# Patient Record
Sex: FEMALE | Race: WHITE | NOT HISPANIC OR LATINO | Employment: FULL TIME | URBAN - METROPOLITAN AREA
[De-identification: names, ages, dates, MRNs, and addresses within clinical notes are randomized per-mention and may not be internally consistent; named-entity substitution may affect disease eponyms.]

---

## 2024-11-08 ENCOUNTER — HOSPITAL ENCOUNTER (INPATIENT)
Facility: HOSPITAL | Age: 24
LOS: 3 days | Discharge: HOME | DRG: 854 | End: 2024-11-11
Attending: FAMILY MEDICINE | Admitting: FAMILY MEDICINE
Payer: MEDICAID

## 2024-11-08 DIAGNOSIS — H72.93: Primary | ICD-10-CM

## 2024-11-08 DIAGNOSIS — H70.90 MASTOIDITIS: ICD-10-CM

## 2024-11-08 DIAGNOSIS — R07.9 CHEST PAIN: ICD-10-CM

## 2024-11-08 PROBLEM — E66.01 SEVERE OBESITY (BMI >= 40): Status: ACTIVE | Noted: 2024-11-08

## 2024-11-08 PROBLEM — H70.091: Status: ACTIVE | Noted: 2024-11-08

## 2024-11-08 PROBLEM — G51.0: Status: ACTIVE | Noted: 2024-11-08

## 2024-11-08 PROBLEM — E66.813 CLASS 3 SEVERE OBESITY WITH BODY MASS INDEX (BMI) OF 50.0 TO 59.9 IN ADULT: Status: ACTIVE | Noted: 2024-11-08

## 2024-11-08 PROBLEM — D72.829 LEUKOCYTOSIS: Status: ACTIVE | Noted: 2024-11-08

## 2024-11-08 PROBLEM — E66.01 CLASS 3 SEVERE OBESITY WITH BODY MASS INDEX (BMI) OF 50.0 TO 59.9 IN ADULT: Status: ACTIVE | Noted: 2024-11-08

## 2024-11-08 PROBLEM — Z86.69 HISTORY OF BELL PALSY: Status: ACTIVE | Noted: 2024-11-08

## 2024-11-08 PROCEDURE — 99223 1ST HOSP IP/OBS HIGH 75: CPT | Mod: 25,,, | Performed by: STUDENT IN AN ORGANIZED HEALTH CARE EDUCATION/TRAINING PROGRAM

## 2024-11-08 PROCEDURE — 69210 REMOVE IMPACTED EAR WAX UNI: CPT | Mod: ,,, | Performed by: STUDENT IN AN ORGANIZED HEALTH CARE EDUCATION/TRAINING PROGRAM

## 2024-11-08 PROCEDURE — 20600001 HC STEP DOWN PRIVATE ROOM

## 2024-11-08 RX ORDER — METRONIDAZOLE 500 MG/100ML
500 INJECTION, SOLUTION INTRAVENOUS
Status: DISCONTINUED | OUTPATIENT
Start: 2024-11-08 | End: 2024-11-11 | Stop reason: HOSPADM

## 2024-11-08 RX ORDER — IBUPROFEN 200 MG
16 TABLET ORAL
Status: DISCONTINUED | OUTPATIENT
Start: 2024-11-08 | End: 2024-11-11 | Stop reason: HOSPADM

## 2024-11-08 RX ORDER — NALOXONE HCL 0.4 MG/ML
0.02 VIAL (ML) INJECTION
Status: DISCONTINUED | OUTPATIENT
Start: 2024-11-08 | End: 2024-11-11 | Stop reason: HOSPADM

## 2024-11-08 RX ORDER — MUPIROCIN 20 MG/G
OINTMENT TOPICAL 2 TIMES DAILY
Status: DISCONTINUED | OUTPATIENT
Start: 2024-11-09 | End: 2024-11-11 | Stop reason: HOSPADM

## 2024-11-08 RX ORDER — CEFTAZIDIME 1 G/1
1 INJECTION, POWDER, FOR SOLUTION INTRAMUSCULAR; INTRAVENOUS
Status: DISCONTINUED | OUTPATIENT
Start: 2024-11-08 | End: 2024-11-09

## 2024-11-08 RX ORDER — ALUMINUM HYDROXIDE, MAGNESIUM HYDROXIDE, AND SIMETHICONE 1200; 120; 1200 MG/30ML; MG/30ML; MG/30ML
30 SUSPENSION ORAL 4 TIMES DAILY PRN
Status: DISCONTINUED | OUTPATIENT
Start: 2024-11-08 | End: 2024-11-11 | Stop reason: HOSPADM

## 2024-11-08 RX ORDER — ACETAMINOPHEN 325 MG/1
650 TABLET ORAL EVERY 4 HOURS PRN
Status: DISCONTINUED | OUTPATIENT
Start: 2024-11-08 | End: 2024-11-11 | Stop reason: HOSPADM

## 2024-11-08 RX ORDER — GLUCAGON 1 MG
1 KIT INJECTION
Status: DISCONTINUED | OUTPATIENT
Start: 2024-11-08 | End: 2024-11-11 | Stop reason: HOSPADM

## 2024-11-08 RX ORDER — IPRATROPIUM BROMIDE AND ALBUTEROL SULFATE 2.5; .5 MG/3ML; MG/3ML
3 SOLUTION RESPIRATORY (INHALATION) EVERY 6 HOURS PRN
Status: DISCONTINUED | OUTPATIENT
Start: 2024-11-08 | End: 2024-11-11 | Stop reason: HOSPADM

## 2024-11-08 RX ORDER — ACETAMINOPHEN 325 MG/1
650 TABLET ORAL EVERY 8 HOURS PRN
Status: DISCONTINUED | OUTPATIENT
Start: 2024-11-08 | End: 2024-11-11 | Stop reason: HOSPADM

## 2024-11-08 RX ORDER — TALC
6 POWDER (GRAM) TOPICAL NIGHTLY PRN
Status: DISCONTINUED | OUTPATIENT
Start: 2024-11-08 | End: 2024-11-11 | Stop reason: HOSPADM

## 2024-11-08 RX ORDER — ONDANSETRON 8 MG/1
8 TABLET, ORALLY DISINTEGRATING ORAL EVERY 8 HOURS PRN
Status: DISCONTINUED | OUTPATIENT
Start: 2024-11-08 | End: 2024-11-11 | Stop reason: HOSPADM

## 2024-11-08 RX ORDER — IBUPROFEN 200 MG
24 TABLET ORAL
Status: DISCONTINUED | OUTPATIENT
Start: 2024-11-08 | End: 2024-11-11 | Stop reason: HOSPADM

## 2024-11-08 RX ORDER — SODIUM CHLORIDE 0.9 % (FLUSH) 0.9 %
10 SYRINGE (ML) INJECTION EVERY 12 HOURS PRN
Status: DISCONTINUED | OUTPATIENT
Start: 2024-11-08 | End: 2024-11-11 | Stop reason: HOSPADM

## 2024-11-09 ENCOUNTER — ANESTHESIA EVENT (OUTPATIENT)
Dept: SURGERY | Facility: HOSPITAL | Age: 24
DRG: 854 | End: 2024-11-09
Payer: MEDICAID

## 2024-11-09 ENCOUNTER — ANESTHESIA (OUTPATIENT)
Dept: SURGERY | Facility: HOSPITAL | Age: 24
DRG: 854 | End: 2024-11-09
Payer: MEDICAID

## 2024-11-09 PROBLEM — F33.1 MAJOR DEPRESSIVE DISORDER, RECURRENT EPISODE, MODERATE: Status: ACTIVE | Noted: 2024-11-09

## 2024-11-09 PROBLEM — F40.01 AGORAPHOBIA WITH PANIC DISORDER: Status: ACTIVE | Noted: 2024-11-09

## 2024-11-09 PROBLEM — A41.9 SEPSIS: Status: ACTIVE | Noted: 2024-11-09

## 2024-11-09 PROBLEM — A41.9 SEPSIS: Status: ACTIVE | Noted: 2024-11-08

## 2024-11-09 LAB
ALBUMIN SERPL BCP-MCNC: 3.1 G/DL (ref 3.5–5.2)
ALP SERPL-CCNC: 73 U/L (ref 40–150)
ALT SERPL W/O P-5'-P-CCNC: 16 U/L (ref 10–44)
ANION GAP SERPL CALC-SCNC: 11 MMOL/L (ref 8–16)
ANION GAP SERPL CALC-SCNC: 7 MMOL/L (ref 8–16)
AST SERPL-CCNC: 16 U/L (ref 10–40)
BASOPHILS # BLD AUTO: 0.05 K/UL (ref 0–0.2)
BASOPHILS # BLD AUTO: 0.05 K/UL (ref 0–0.2)
BASOPHILS NFR BLD: 0.4 % (ref 0–1.9)
BASOPHILS NFR BLD: 0.4 % (ref 0–1.9)
BILIRUB SERPL-MCNC: 0.2 MG/DL (ref 0.1–1)
BUN SERPL-MCNC: 8 MG/DL (ref 6–20)
BUN SERPL-MCNC: 9 MG/DL (ref 6–20)
CALCIUM SERPL-MCNC: 8.9 MG/DL (ref 8.7–10.5)
CALCIUM SERPL-MCNC: 9 MG/DL (ref 8.7–10.5)
CHLORIDE SERPL-SCNC: 106 MMOL/L (ref 95–110)
CHLORIDE SERPL-SCNC: 106 MMOL/L (ref 95–110)
CO2 SERPL-SCNC: 22 MMOL/L (ref 23–29)
CO2 SERPL-SCNC: 25 MMOL/L (ref 23–29)
CREAT SERPL-MCNC: 0.7 MG/DL (ref 0.5–1.4)
CREAT SERPL-MCNC: 0.8 MG/DL (ref 0.5–1.4)
CRP SERPL-MCNC: 26 MG/L (ref 0–8.2)
DIFFERENTIAL METHOD BLD: ABNORMAL
DIFFERENTIAL METHOD BLD: ABNORMAL
EOSINOPHIL # BLD AUTO: 0.2 K/UL (ref 0–0.5)
EOSINOPHIL # BLD AUTO: 0.3 K/UL (ref 0–0.5)
EOSINOPHIL NFR BLD: 1.7 % (ref 0–8)
EOSINOPHIL NFR BLD: 2.3 % (ref 0–8)
ERYTHROCYTE [DISTWIDTH] IN BLOOD BY AUTOMATED COUNT: 14.5 % (ref 11.5–14.5)
ERYTHROCYTE [DISTWIDTH] IN BLOOD BY AUTOMATED COUNT: 14.5 % (ref 11.5–14.5)
ERYTHROCYTE [SEDIMENTATION RATE] IN BLOOD BY PHOTOMETRIC METHOD: 49 MM/HR (ref 0–36)
EST. GFR  (NO RACE VARIABLE): >60 ML/MIN/1.73 M^2
EST. GFR  (NO RACE VARIABLE): >60 ML/MIN/1.73 M^2
GLUCOSE SERPL-MCNC: 107 MG/DL (ref 70–110)
GLUCOSE SERPL-MCNC: 94 MG/DL (ref 70–110)
HCT VFR BLD AUTO: 39.3 % (ref 37–48.5)
HCT VFR BLD AUTO: 40.1 % (ref 37–48.5)
HGB BLD-MCNC: 11.8 G/DL (ref 12–16)
HGB BLD-MCNC: 12.2 G/DL (ref 12–16)
IMM GRANULOCYTES # BLD AUTO: 0.04 K/UL (ref 0–0.04)
IMM GRANULOCYTES # BLD AUTO: 0.05 K/UL (ref 0–0.04)
IMM GRANULOCYTES NFR BLD AUTO: 0.3 % (ref 0–0.5)
IMM GRANULOCYTES NFR BLD AUTO: 0.4 % (ref 0–0.5)
LACTATE SERPL-SCNC: 0.8 MMOL/L (ref 0.5–2.2)
LYMPHOCYTES # BLD AUTO: 2.8 K/UL (ref 1–4.8)
LYMPHOCYTES # BLD AUTO: 2.9 K/UL (ref 1–4.8)
LYMPHOCYTES NFR BLD: 20.6 % (ref 18–48)
LYMPHOCYTES NFR BLD: 20.7 % (ref 18–48)
MAGNESIUM SERPL-MCNC: 2.1 MG/DL (ref 1.6–2.6)
MCH RBC QN AUTO: 25.2 PG (ref 27–31)
MCH RBC QN AUTO: 25.2 PG (ref 27–31)
MCHC RBC AUTO-ENTMCNC: 30 G/DL (ref 32–36)
MCHC RBC AUTO-ENTMCNC: 30.4 G/DL (ref 32–36)
MCV RBC AUTO: 83 FL (ref 82–98)
MCV RBC AUTO: 84 FL (ref 82–98)
MONOCYTES # BLD AUTO: 0.9 K/UL (ref 0.3–1)
MONOCYTES # BLD AUTO: 1.1 K/UL (ref 0.3–1)
MONOCYTES NFR BLD: 6.9 % (ref 4–15)
MONOCYTES NFR BLD: 8 % (ref 4–15)
NEUTROPHILS # BLD AUTO: 9.5 K/UL (ref 1.8–7.7)
NEUTROPHILS # BLD AUTO: 9.7 K/UL (ref 1.8–7.7)
NEUTROPHILS NFR BLD: 68.3 % (ref 38–73)
NEUTROPHILS NFR BLD: 70 % (ref 38–73)
NRBC BLD-RTO: 0 /100 WBC
NRBC BLD-RTO: 0 /100 WBC
PHOSPHATE SERPL-MCNC: 3.3 MG/DL (ref 2.7–4.5)
PLATELET # BLD AUTO: 381 K/UL (ref 150–450)
PLATELET # BLD AUTO: 391 K/UL (ref 150–450)
PMV BLD AUTO: 10.2 FL (ref 9.2–12.9)
PMV BLD AUTO: 10.2 FL (ref 9.2–12.9)
POTASSIUM SERPL-SCNC: 3.8 MMOL/L (ref 3.5–5.1)
POTASSIUM SERPL-SCNC: 3.8 MMOL/L (ref 3.5–5.1)
PROT SERPL-MCNC: 7.5 G/DL (ref 6–8.4)
RBC # BLD AUTO: 4.69 M/UL (ref 4–5.4)
RBC # BLD AUTO: 4.84 M/UL (ref 4–5.4)
SODIUM SERPL-SCNC: 138 MMOL/L (ref 136–145)
SODIUM SERPL-SCNC: 139 MMOL/L (ref 136–145)
VANCOMYCIN TROUGH SERPL-MCNC: 8.7 UG/ML (ref 10–22)
WBC # BLD AUTO: 13.55 K/UL (ref 3.9–12.7)
WBC # BLD AUTO: 14.14 K/UL (ref 3.9–12.7)

## 2024-11-09 PROCEDURE — 80202 ASSAY OF VANCOMYCIN: CPT | Performed by: HOSPITALIST

## 2024-11-09 PROCEDURE — 87116 MYCOBACTERIA CULTURE: CPT | Performed by: STUDENT IN AN ORGANIZED HEALTH CARE EDUCATION/TRAINING PROGRAM

## 2024-11-09 PROCEDURE — 63600175 PHARM REV CODE 636 W HCPCS: Performed by: HOSPITALIST

## 2024-11-09 PROCEDURE — 099570Z DRAINAGE OF RIGHT MIDDLE EAR WITH DRAINAGE DEVICE, VIA NATURAL OR ARTIFICIAL OPENING: ICD-10-PCS | Performed by: STUDENT IN AN ORGANIZED HEALTH CARE EDUCATION/TRAINING PROGRAM

## 2024-11-09 PROCEDURE — 25000003 PHARM REV CODE 250

## 2024-11-09 PROCEDURE — 63600175 PHARM REV CODE 636 W HCPCS

## 2024-11-09 PROCEDURE — 87102 FUNGUS ISOLATION CULTURE: CPT | Performed by: STUDENT IN AN ORGANIZED HEALTH CARE EDUCATION/TRAINING PROGRAM

## 2024-11-09 PROCEDURE — 85652 RBC SED RATE AUTOMATED: CPT

## 2024-11-09 PROCEDURE — 36000705 HC OR TIME LEV I EA ADD 15 MIN: Performed by: STUDENT IN AN ORGANIZED HEALTH CARE EDUCATION/TRAINING PROGRAM

## 2024-11-09 PROCEDURE — 71000033 HC RECOVERY, INTIAL HOUR: Performed by: STUDENT IN AN ORGANIZED HEALTH CARE EDUCATION/TRAINING PROGRAM

## 2024-11-09 PROCEDURE — 37000009 HC ANESTHESIA EA ADD 15 MINS: Performed by: STUDENT IN AN ORGANIZED HEALTH CARE EDUCATION/TRAINING PROGRAM

## 2024-11-09 PROCEDURE — 84100 ASSAY OF PHOSPHORUS: CPT

## 2024-11-09 PROCEDURE — 27201423 OPTIME MED/SURG SUP & DEVICES STERILE SUPPLY: Performed by: STUDENT IN AN ORGANIZED HEALTH CARE EDUCATION/TRAINING PROGRAM

## 2024-11-09 PROCEDURE — 69436 CREATE EARDRUM OPENING: CPT | Mod: RT,,, | Performed by: STUDENT IN AN ORGANIZED HEALTH CARE EDUCATION/TRAINING PROGRAM

## 2024-11-09 PROCEDURE — 86140 C-REACTIVE PROTEIN: CPT

## 2024-11-09 PROCEDURE — 87040 BLOOD CULTURE FOR BACTERIA: CPT

## 2024-11-09 PROCEDURE — 87206 SMEAR FLUORESCENT/ACID STAI: CPT | Performed by: STUDENT IN AN ORGANIZED HEALTH CARE EDUCATION/TRAINING PROGRAM

## 2024-11-09 PROCEDURE — 25000003 PHARM REV CODE 250: Performed by: NURSE ANESTHETIST, CERTIFIED REGISTERED

## 2024-11-09 PROCEDURE — 36415 COLL VENOUS BLD VENIPUNCTURE: CPT

## 2024-11-09 PROCEDURE — 63600175 PHARM REV CODE 636 W HCPCS: Performed by: ANESTHESIOLOGY

## 2024-11-09 PROCEDURE — 83735 ASSAY OF MAGNESIUM: CPT

## 2024-11-09 PROCEDURE — 37000008 HC ANESTHESIA 1ST 15 MINUTES: Performed by: STUDENT IN AN ORGANIZED HEALTH CARE EDUCATION/TRAINING PROGRAM

## 2024-11-09 PROCEDURE — 80048 BASIC METABOLIC PNL TOTAL CA: CPT

## 2024-11-09 PROCEDURE — 83605 ASSAY OF LACTIC ACID: CPT

## 2024-11-09 PROCEDURE — 25000003 PHARM REV CODE 250: Performed by: ANESTHESIOLOGY

## 2024-11-09 PROCEDURE — 87070 CULTURE OTHR SPECIMN AEROBIC: CPT | Performed by: STUDENT IN AN ORGANIZED HEALTH CARE EDUCATION/TRAINING PROGRAM

## 2024-11-09 PROCEDURE — 71000015 HC POSTOP RECOV 1ST HR: Performed by: STUDENT IN AN ORGANIZED HEALTH CARE EDUCATION/TRAINING PROGRAM

## 2024-11-09 PROCEDURE — 80053 COMPREHEN METABOLIC PANEL: CPT

## 2024-11-09 PROCEDURE — 63600175 PHARM REV CODE 636 W HCPCS: Performed by: NURSE ANESTHETIST, CERTIFIED REGISTERED

## 2024-11-09 PROCEDURE — 20600001 HC STEP DOWN PRIVATE ROOM

## 2024-11-09 PROCEDURE — 36415 COLL VENOUS BLD VENIPUNCTURE: CPT | Performed by: HOSPITALIST

## 2024-11-09 PROCEDURE — 87075 CULTR BACTERIA EXCEPT BLOOD: CPT | Performed by: STUDENT IN AN ORGANIZED HEALTH CARE EDUCATION/TRAINING PROGRAM

## 2024-11-09 PROCEDURE — 25000003 PHARM REV CODE 250: Performed by: HOSPITALIST

## 2024-11-09 PROCEDURE — 36000704 HC OR TIME LEV I 1ST 15 MIN: Performed by: STUDENT IN AN ORGANIZED HEALTH CARE EDUCATION/TRAINING PROGRAM

## 2024-11-09 PROCEDURE — 85025 COMPLETE CBC W/AUTO DIFF WBC: CPT | Mod: 91

## 2024-11-09 DEVICE — GROMMET MOD ARMSTR 1.14MM: Type: IMPLANTABLE DEVICE | Site: EAR | Status: FUNCTIONAL

## 2024-11-09 RX ORDER — FENTANYL CITRATE 50 UG/ML
25 INJECTION, SOLUTION INTRAMUSCULAR; INTRAVENOUS EVERY 5 MIN PRN
Status: DISCONTINUED | OUTPATIENT
Start: 2024-11-09 | End: 2024-11-09 | Stop reason: HOSPADM

## 2024-11-09 RX ORDER — CLONAZEPAM 0.5 MG/1
0.5 TABLET ORAL 2 TIMES DAILY PRN
Status: DISCONTINUED | OUTPATIENT
Start: 2024-11-09 | End: 2024-11-11 | Stop reason: HOSPADM

## 2024-11-09 RX ORDER — TRAZODONE HYDROCHLORIDE 50 MG/1
50 TABLET ORAL NIGHTLY PRN
Status: DISCONTINUED | OUTPATIENT
Start: 2024-11-09 | End: 2024-11-11 | Stop reason: HOSPADM

## 2024-11-09 RX ORDER — BUPROPION HYDROCHLORIDE 150 MG/1
150 TABLET ORAL EVERY MORNING
Status: DISCONTINUED | OUTPATIENT
Start: 2024-11-09 | End: 2024-11-11 | Stop reason: HOSPADM

## 2024-11-09 RX ORDER — HALOPERIDOL 5 MG/ML
0.5 INJECTION INTRAMUSCULAR EVERY 10 MIN PRN
Status: DISCONTINUED | OUTPATIENT
Start: 2024-11-09 | End: 2024-11-09 | Stop reason: HOSPADM

## 2024-11-09 RX ORDER — ESCITALOPRAM OXALATE 10 MG/1
10 TABLET ORAL DAILY
COMMUNITY

## 2024-11-09 RX ORDER — BUSPIRONE HYDROCHLORIDE 7.5 MG/1
7.5 TABLET ORAL DAILY
COMMUNITY

## 2024-11-09 RX ORDER — CEFTAZIDIME 2 G/1
2 INJECTION, POWDER, FOR SOLUTION INTRAVENOUS
Status: DISCONTINUED | OUTPATIENT
Start: 2024-11-09 | End: 2024-11-11 | Stop reason: HOSPADM

## 2024-11-09 RX ORDER — CIPROFLOXACIN AND DEXAMETHASONE 3; 1 MG/ML; MG/ML
4 SUSPENSION/ DROPS AURICULAR (OTIC) 2 TIMES DAILY
Status: DISCONTINUED | OUTPATIENT
Start: 2024-11-09 | End: 2024-11-11 | Stop reason: HOSPADM

## 2024-11-09 RX ORDER — GLUCAGON 1 MG
1 KIT INJECTION
Status: DISCONTINUED | OUTPATIENT
Start: 2024-11-09 | End: 2024-11-09 | Stop reason: HOSPADM

## 2024-11-09 RX ORDER — ONDANSETRON HYDROCHLORIDE 2 MG/ML
INJECTION, SOLUTION INTRAVENOUS
Status: DISCONTINUED | OUTPATIENT
Start: 2024-11-09 | End: 2024-11-09

## 2024-11-09 RX ORDER — PROCHLORPERAZINE EDISYLATE 5 MG/ML
5 INJECTION INTRAMUSCULAR; INTRAVENOUS EVERY 30 MIN PRN
Status: DISCONTINUED | OUTPATIENT
Start: 2024-11-09 | End: 2024-11-09 | Stop reason: HOSPADM

## 2024-11-09 RX ORDER — HYDROMORPHONE HYDROCHLORIDE 1 MG/ML
0.2 INJECTION, SOLUTION INTRAMUSCULAR; INTRAVENOUS; SUBCUTANEOUS EVERY 5 MIN PRN
Status: DISCONTINUED | OUTPATIENT
Start: 2024-11-09 | End: 2024-11-09 | Stop reason: HOSPADM

## 2024-11-09 RX ORDER — ESCITALOPRAM OXALATE 5 MG/1
10 TABLET ORAL DAILY
Status: DISCONTINUED | OUTPATIENT
Start: 2024-11-09 | End: 2024-11-11 | Stop reason: HOSPADM

## 2024-11-09 RX ORDER — TRAZODONE HYDROCHLORIDE 50 MG/1
TABLET ORAL NIGHTLY PRN
COMMUNITY
Start: 2024-02-27

## 2024-11-09 RX ORDER — PROPOFOL 10 MG/ML
VIAL (ML) INTRAVENOUS
Status: DISCONTINUED | OUTPATIENT
Start: 2024-11-09 | End: 2024-11-09

## 2024-11-09 RX ORDER — MIDAZOLAM HYDROCHLORIDE 1 MG/ML
INJECTION INTRAMUSCULAR; INTRAVENOUS
Status: DISCONTINUED | OUTPATIENT
Start: 2024-11-09 | End: 2024-11-09

## 2024-11-09 RX ORDER — OXYCODONE HYDROCHLORIDE 5 MG/1
5 TABLET ORAL
Status: DISCONTINUED | OUTPATIENT
Start: 2024-11-09 | End: 2024-11-09 | Stop reason: HOSPADM

## 2024-11-09 RX ORDER — FENTANYL CITRATE 50 UG/ML
INJECTION, SOLUTION INTRAMUSCULAR; INTRAVENOUS
Status: DISCONTINUED | OUTPATIENT
Start: 2024-11-09 | End: 2024-11-09

## 2024-11-09 RX ORDER — CIPROFLOXACIN AND DEXAMETHASONE 3; 1 MG/ML; MG/ML
SUSPENSION/ DROPS AURICULAR (OTIC)
Status: DISPENSED
Start: 2024-11-09 | End: 2024-11-09

## 2024-11-09 RX ORDER — LIDOCAINE HYDROCHLORIDE 20 MG/ML
INJECTION INTRAVENOUS
Status: DISCONTINUED | OUTPATIENT
Start: 2024-11-09 | End: 2024-11-09

## 2024-11-09 RX ADMIN — SODIUM CHLORIDE: 0.9 INJECTION, SOLUTION INTRAVENOUS at 11:11

## 2024-11-09 RX ADMIN — MIDAZOLAM HYDROCHLORIDE 2 MG: 2 INJECTION, SOLUTION INTRAMUSCULAR; INTRAVENOUS at 11:11

## 2024-11-09 RX ADMIN — PROPOFOL 50 MG: 10 INJECTION, EMULSION INTRAVENOUS at 11:11

## 2024-11-09 RX ADMIN — METRONIDAZOLE 500 MG: 5 INJECTION, SOLUTION INTRAVENOUS at 04:11

## 2024-11-09 RX ADMIN — LIDOCAINE HYDROCHLORIDE 80 MG: 20 INJECTION INTRAVENOUS at 11:11

## 2024-11-09 RX ADMIN — METRONIDAZOLE 500 MG: 5 INJECTION, SOLUTION INTRAVENOUS at 10:11

## 2024-11-09 RX ADMIN — CIPROFLOXACIN AND DEXAMETHASONE 4 DROP: 3; 1 SUSPENSION/ DROPS AURICULAR (OTIC) at 07:11

## 2024-11-09 RX ADMIN — METRONIDAZOLE 500 MG: 5 INJECTION, SOLUTION INTRAVENOUS at 09:11

## 2024-11-09 RX ADMIN — CEFTAZIDIME 2 G: 2 INJECTION, POWDER, FOR SOLUTION INTRAVENOUS at 08:11

## 2024-11-09 RX ADMIN — ONDANSETRON 4 MG: 2 INJECTION INTRAMUSCULAR; INTRAVENOUS at 11:11

## 2024-11-09 RX ADMIN — MUPIROCIN: 20 OINTMENT TOPICAL at 09:11

## 2024-11-09 RX ADMIN — METRONIDAZOLE 500 MG: 5 INJECTION, SOLUTION INTRAVENOUS at 12:11

## 2024-11-09 RX ADMIN — ONDANSETRON 8 MG: 8 TABLET, ORALLY DISINTEGRATING ORAL at 12:11

## 2024-11-09 RX ADMIN — CEFTAZIDIME 1 G: 1 INJECTION, POWDER, FOR SOLUTION INTRAMUSCULAR; INTRAVENOUS at 12:11

## 2024-11-09 RX ADMIN — ESCITALOPRAM 10 MG: 5 TABLET, FILM COATED ORAL at 09:11

## 2024-11-09 RX ADMIN — VANCOMYCIN HYDROCHLORIDE 2000 MG: 10 INJECTION, POWDER, LYOPHILIZED, FOR SOLUTION INTRAVENOUS at 01:11

## 2024-11-09 RX ADMIN — VANCOMYCIN HYDROCHLORIDE 1500 MG: 1.5 INJECTION, POWDER, LYOPHILIZED, FOR SOLUTION INTRAVENOUS at 11:11

## 2024-11-09 RX ADMIN — VANCOMYCIN HYDROCHLORIDE 2000 MG: 10 INJECTION, POWDER, LYOPHILIZED, FOR SOLUTION INTRAVENOUS at 04:11

## 2024-11-09 RX ADMIN — MUPIROCIN: 20 OINTMENT TOPICAL at 07:11

## 2024-11-09 RX ADMIN — FENTANYL CITRATE 50 MCG: 50 INJECTION, SOLUTION INTRAMUSCULAR; INTRAVENOUS at 11:11

## 2024-11-09 RX ADMIN — BUSPIRONE HYDROCHLORIDE 7.5 MG: 5 TABLET ORAL at 09:11

## 2024-11-09 RX ADMIN — OXYCODONE 5 MG: 5 TABLET ORAL at 12:11

## 2024-11-09 RX ADMIN — CEFTAZIDIME 2 G: 2 INJECTION, POWDER, FOR SOLUTION INTRAVENOUS at 12:11

## 2024-11-09 RX ADMIN — HYDROMORPHONE HYDROCHLORIDE 0.2 MG: 1 INJECTION, SOLUTION INTRAMUSCULAR; INTRAVENOUS; SUBCUTANEOUS at 12:11

## 2024-11-09 RX ADMIN — PROPOFOL 200 MG: 10 INJECTION, EMULSION INTRAVENOUS at 11:11

## 2024-11-09 NOTE — ASSESSMENT & PLAN NOTE
Liat Hernandez is a 23 y.o. female with Mastoiditis [H70.90] with associated facial nerve paralysis (house Brackmann V on the right, I on the left).       -- NPO at midnight in anticipation for mastoidectomy   -- Pain/antinausea medications prn   -- Remainder of care per primary team  -- Please page ENT on call with any questions or concerns

## 2024-11-09 NOTE — ASSESSMENT & PLAN NOTE
Leukocytosis   Tympanic membrane rupture, bilateral - chronic   - 1/4 SIRS on admission; WBC 16  - lactic acid, CRP and ESR pending   - BCX ordered   - MRI temporal bone revealed fluid throughout the right mastoid air cells and within the right middle ear suggesting otomastoiditis   - started on broad spectrum mastoiditis abx: vancomycin + metronidazole + ceftaz   - ENT consulted, appreciate recommendations  -- NPO at midnight in anticipation for mastoidectomy   -- Pain/antinausea medications prn   - daily CBC and CMP   - neuro checks q4h

## 2024-11-09 NOTE — OP NOTE
DATE OF PROCEDURE: 11/9/2024     PREOPERATIVE DIAGNOSES:   Mastoiditis [H70.90]    POSTOPERATIVE DIAGNOSES:   Mastoiditis [H70.90]    SURGEON:  Surgeons and Role:     * Se Jones MD - Primary     * Carolin Osullivan MD - Resident - Assisting    PROCEDURES PERFORMED:   Right myringotomy with PET insertion.     ANESTHESIA: General/MAC    FINDINGS:  Purulent drainage in right middle ear.     INDICATIONS FOR PROCEDURE:   Liat Hernandez is a 23 y.o. female with a history of score was then right mastoid cavity.  He presents with the acute onset of right-sided facial weakness and CT scan showing opacification of her sclerotic mastoid cavity with a middle ear effusion.  She was advised to undergo drainage and insertion of PE tube for decompression of the middle ear space.    She was apprised of the risks, benefits and alternatives to surgery.  In spite of the risk inherent to surgery,she provided informed consent for the aforementioned procedures.     PROCEDURE IN DETAIL:  The patient was taken to the operating room and placed on the operating table in the supine position.  General endotracheal anesthesia was induced by the anesthesia team.     The patient was then prepped and draped in usual sterile fashion.  Preoperative time-out was taken to confirm the patient and procedure being performed.  A speculum was placed in the right external canal.  The TM was visualized and serosanguineous fluid noted behind the drum.  There was a small pocket visualized in the attic which appeared to be mucopurulent in nature.  Cultures were taken from this area again.  A myringotomy knife was used to incise the anterior inferior quadrant.  Mucoid discharge was noted within the middle ear.  A pressure equalizing tube was then placed without difficulty.  Ciprodex drops were then placed within the external order irrigated now.  A cotton ball was then placed within the external auditory canal.  This point the procedure was  deemed to be complete.  The patient was then handed over to Anesthesia who woke the patient she was transported to the recovery room in a stable condition of the time of dictation.    There were no intraoperative complications.  I was present for and participated in the entire procedure as dictated above.       ESTIMATED BLOOD LOSS: 5cc    SPECIMENS:   Specimen (24h ago, onward)      None            Se Jones MD

## 2024-11-09 NOTE — SUBJECTIVE & OBJECTIVE
Interval History:   Symptoms:   Same.  Diet:  NPO.  Activity level:   Returning to normal.  Pain:  No pain in ear.      Review of Systems   Constitutional:  Negative for fever.   HENT:  Negative for sinus pain, sore throat and trouble swallowing.    Respiratory:  Negative for shortness of breath.    Cardiovascular:  Negative for chest pain.   Gastrointestinal:  Negative for abdominal pain.     Objective:     Vital Signs (Most Recent):  Temp: 98.9 °F (37.2 °C) (11/09/24 1344)  Pulse: 91 (11/09/24 1344)  Resp: 18 (11/09/24 1344)  BP: 118/64 (11/09/24 1344)  SpO2: (!) 94 % (11/09/24 1344) Vital Signs (24h Range):  Temp:  [97.9 °F (36.6 °C)-98.9 °F (37.2 °C)] 98.9 °F (37.2 °C)  Pulse:  [81-99] 91  Resp:  [11-23] 18  SpO2:  [92 %-99 %] 94 %  BP: (118-166)/(63-88) 118/64     Weight: (!) 137 kg (302 lb 0.5 oz)  Body mass index is 55.24 kg/m².    Intake/Output Summary (Last 24 hours) at 11/9/2024 1424  Last data filed at 11/9/2024 1137  Gross per 24 hour   Intake 20 ml   Output --   Net 20 ml         Physical Exam  Constitutional:       General: She is not in acute distress.  Cardiovascular:      Rate and Rhythm: Normal rate and regular rhythm.   Pulmonary:      Effort: No respiratory distress.   Musculoskeletal:      Right lower leg: No edema.      Left lower leg: No edema.   Neurological:      Mental Status: She is alert.      Comments: + Bell Palsy             Significant Labs: All pertinent labs within the past 24 hours have been reviewed.    Significant Imaging: I have reviewed all pertinent imaging results/findings within the past 24 hours.

## 2024-11-09 NOTE — ASSESSMENT & PLAN NOTE
"2/4 SIRS on admission; WBC 16 and tachycardia  This patient does have evidence of infective focus  My overall impression is sepsis.  Source: Respiratory  Antibiotics given-   Antibiotics (72h ago, onward)      Start     Stop Route Frequency Ordered    11/09/24 1145  ciprofloxacin-dexAMETHasone 0.3-0.1% otic suspension 4 drop         -- RIGHT EAR 2 times daily 11/09/24 1042    11/09/24 0900  mupirocin 2 % ointment         11/14/24 0859 Nasl 2 times daily 11/08/24 2258    11/09/24 0900  ceftAZIDime injection 2 g         -- IV Every 8 hours (non-standard times) 11/09/24 0847    11/09/24 0200  vancomycin 2 g in dextrose 5 % 500 mL IVPB         -- IV Every 12 hours (non-standard times) 11/08/24 2257    11/08/24 2345  metronidazole IVPB 500 mg         -- IV Every 8 hours (non-standard times) 11/08/24 2240    11/08/24 2338  vancomycin - pharmacy to dose  (vancomycin IVPB (PEDS and ADULTS))        Placed in "And" Linked Group    -- IV pharmacy to manage frequency 11/08/24 2240          Source control achieved by: abx and surgery  "

## 2024-11-09 NOTE — PROGRESS NOTES
Pharmacokinetic Assessment Follow Up: IV Vancomycin    Vancomycin serum concentration assessment(s):    The trough level was drawn correctly and can be used to guide therapy at this time. The measurement is below the desired definitive target range of 10 to 20 mcg/mL.    Vancomycin Regimen Plan:    -Afebrile, on room air, without pressor requirements.  -White count down-trending.   -Blood culture 11/08 and 11/09 with no growth to date, wound culture 11/09 sent and pending.   -Given stable renal function, decreasing dose and increasing frequency from 2g IV every 12 hours to 1.5g IV every 8 hours with next serum trough concentration measured at 2300 prior to 4th dose on 11/10, or sooner if clinically warranted.     Drug levels (last 3 results):  Recent Labs   Lab Result Units 11/09/24  1547   Vancomycin-Trough ug/mL 8.7*       Pharmacy will continue to follow and monitor vancomycin.    Please contact pharmacy at extension [phone] for questions regarding this assessment.    Thank you for the consult,   Kwame Quesada       Patient brief summary:  Liat Hernandez is a 23 y.o. female initiated on antimicrobial therapy with IV Vancomycin for treatment of  mastoiditis    Drug Allergies:   Review of patient's allergies indicates:   Allergen Reactions    Penicillins Itching     Pt. Reports throat itches when she takes it    Sulfa (sulfonamide antibiotics)        Actual Body Weight:   137 kg    Renal Function:   Estimated Creatinine Clearance: 147.8 mL/min (based on SCr of 0.8 mg/dL).,     Dialysis Method (if applicable):  N/A

## 2024-11-09 NOTE — BRIEF OP NOTE
[facility] - Telemetry Stepdown  Brief Operative Note    SUMMARY     Surgery Date: 11/9/2024     Surgeons and Role:     * Se Jones MD - Primary     * Carolin Osullivan MD - Resident - Assisting        Pre-op Diagnosis:  Mastoiditis [H70.90]    Post-op Diagnosis:  Post-Op Diagnosis Codes:     * Mastoiditis [H70.90]    Procedure(s) (LRB):  MYRINGOTOMY, WITH TYMPANOSTOMY TUBE INSERTION (Right)    Anesthesia: General/MAC    Implants:  Implant Name Type Inv. Item Serial No.  Lot No. LRB No. Used Action   GROMMET MOD ARMSTR 1.14MM - [id number]  GROMMET MOD ARMSTR 1.14MM   [id number] Right 1 Implanted       Operative Findings:   R TM attic retraction with purulence vs squamous debris   R myringotomy with PE tube placement anterior inferior  Mucoid ear effusion     Estimated Blood Loss: * No values recorded between 11/9/2024 11:43 AM and 11/9/2024 11:51 AM *    Estimated Blood Loss has not been documented. EBL = 0 cc.         Specimens:   Specimen (24h ago, onward)      None            [id number]

## 2024-11-09 NOTE — TRANSFER OF CARE
"Anesthesia Transfer of Care Note    Patient: Liat Hernandez    Procedure(s) Performed: Procedure(s) (LRB):  MYRINGOTOMY, WITH TYMPANOSTOMY TUBE INSERTION (Right)    Patient location: PACU    Anesthesia Type: general    Transport from OR: Transported from OR on 6-10 L/min O2 by face mask with adequate spontaneous ventilation    Post pain: adequate analgesia    Post assessment: no apparent anesthetic complications    Post vital signs: stable    Level of consciousness: awake    Nausea/Vomiting: no nausea/vomiting    Complications: none    Transfer of care protocol was followed      Last vitals: Visit Vitals  /77 (BP Location: Right arm, Patient Position: Lying)   Pulse 88   Temp 36.9 °C (98.5 °F) (Oral)   Resp 18   Ht 5' 2" (1.575 m)   Wt (!) 137 kg (302 lb 0.5 oz)   LMP 10/24/2024 (Approximate)   SpO2 97%   Breastfeeding No   BMI 55.24 kg/m²     "

## 2024-11-09 NOTE — ANESTHESIA PROCEDURE NOTES
Intubation    Date/Time: 11/9/2024 11:37 AM    Performed by: Gloria Hobson CRNA  Authorized by: Daniel Mcelroy MD    Intubation:     Induction:  Intravenous    Mask Ventilation:  Not attempted    Attempts:  1    Attempted By:  CRNA    Difficult Airway Encountered?: No      Complications:  None    Airway Device:  Supraglottic airway/LMA    Airway Device Size:  5.0    Style/Cuff Inflation:  Cuffed    Secured at:  The lips    Complicating Factors:  None    Findings Post-Intubation:  BS equal bilateral

## 2024-11-09 NOTE — ASSESSMENT & PLAN NOTE
Sepsis   Tympanic membrane rupture, bilateral - chronic   MRI temporal bone revealed fluid throughout the right mastoid air cells and within the right middle ear suggesting otomastoiditis   - BCX ordered   - started on broad spectrum mastoiditis abx: vancomycin + metronidazole + ceftaz   - ENT consulted, appreciate recommendations  -- OR today  - daily CBC and CMP , monitor vitals

## 2024-11-09 NOTE — H&P
"  [facility]  History & Physical    Patient Name: Liat Hernandez  MRN: [id number]  Patient Class: IP- Inpatient  Admission Date: 11/8/2024  Attending Physician: Alexandro Kaufman MD   Primary Care Provider: Jax, Start         Patient information was obtained from patient, past medical records, and ER records.     Subjective:     Principal Problem:Acute mastoiditis of right side with facial paralysis    Chief Complaint: No chief complaint on file.       HPI: Liat Hernandez is a 23 y.o. female with chronic TM rupture, hx of Bell's palsy, MDD, panic disorder and morbid obesity being admitted to hospital medicine as a transfer from [facility] for ENT evaluation of right mastoiditis with facial paralysis. Patient reports posterior right ear pain and inner ear pain that started approximately 3 days ago. Endoreses associated gradual onset of right facial numbness and paralysis. She was evaluated at [facility] ago for this issue prior to [facility]. Denies vision changes, fever, chills, abdominal pain, nausea, vomiting, diarrhea, chest pain or shortness of breath. Denies familiarity of symptoms - did have Bell's palsy in the past on the left side but it was not associated with ear pain/infection. Denies alcohol, tobacco or drug use.         Per  transfer note "Patient is a 23-year-old female with a history of Bell's palsy and chronic tympanic membrane ruptures that presents to the emergency department because of right-sided facial pain with associated neurological symptoms.  Per notes, 2 days prior to presentation, patient began to experience posterior right ear pain.  This now progressed into right upper and lower facial droop and tongue paresthesias.  Patient hypertensive and tachycardic on presentation.  White count 16.  H&H 12/38.  MCV 78.  Platelet 382.  CMP shows no significant electrolyte abnormalities.  Negative pregnancy test.  " "Negative COVID and flu test.  Blood cultures pending.  CT scan of the head and temporal bones with contrast shows evidence of osto-mastoiditis.  Patient given vancomycin, Rocephin.  I agreed to accept patient for transfer for ENT consultation/higher level of care"    Past Medical History:   Diagnosis Date    HTN (hypertension)        No past surgical history on file.    Review of patient's allergies indicates:   Allergen Reactions    Penicillins Itching     Pt. Reports throat itches when she takes it    Sulfa (sulfonamide antibiotics)        Current Facility-Administered Medications on File Prior to Encounter   Medication    [COMPLETED] cefTRIAXone (ROCEPHIN) 2 g in dextrose 5 % 100 mL IVPB (ready to mix)    [COMPLETED] gadobutroL (GADAVIST) injection 15 mL    [COMPLETED] vancomycin (VANCOCIN) 2,500 mg in D5W 500 mL IVPB    [DISCONTINUED] cefTRIAXone (ROCEPHIN) 1 g in dextrose 5 % 100 mL IVPB (ready to mix)    [DISCONTINUED] vancomycin (VANCOCIN) 2,000 mg in 0.9% NaCl 500 mL IVPB    [DISCONTINUED] vancomycin - pharmacy to dose     Current Outpatient Medications on File Prior to Encounter   Medication Sig    buPROPion (WELLBUTRIN XL) 150 MG TB24 tablet Take 150 mg by mouth every morning.    clonazePAM (KLONOPIN) 0.5 MG tablet Take 0.5 mg by mouth 2 (two) times daily as needed for Anxiety.    traZODone (DESYREL) 50 MG tablet nightly as needed.    busPIRone (BUSPAR) 7.5 MG tablet Take 7.5 mg by mouth once daily.    EScitalopram oxalate (LEXAPRO) 10 MG tablet Take 10 mg by mouth once daily.     Family History    None       Tobacco Use    Smoking status: Never     Passive exposure: Never    Smokeless tobacco: Not on file   Substance and Sexual Activity    Alcohol use: Never    Drug use: Never    Sexual activity: Not on file     Review of Systems   Constitutional:  Negative for activity change, chills and fever.   HENT:  Positive for ear pain. Negative for trouble swallowing.    Eyes:  Negative for photophobia and visual " disturbance.   Respiratory:  Negative for cough, chest tightness and shortness of breath.    Cardiovascular:  Negative for chest pain, palpitations and leg swelling.   Gastrointestinal:  Negative for abdominal pain, constipation, diarrhea, nausea and vomiting.   Genitourinary:  Negative for dysuria, frequency and hematuria.   Musculoskeletal:  Negative for back pain, gait problem and neck pain.   Skin:  Negative for rash and wound.   Neurological:  Positive for facial asymmetry, weakness and numbness. Negative for dizziness, syncope, speech difficulty and light-headedness.   Psychiatric/Behavioral:  Negative for agitation and confusion. The patient is not nervous/anxious.      Objective:     Vital Signs (Most Recent):  Temp: 98.9 °F (37.2 °C) (11/08/24 2147)  Pulse: 94 (11/08/24 2200)  Resp: 18 (11/08/24 2147)  BP: 118/69 (11/08/24 2147)  SpO2: 98 % (11/08/24 2147) Vital Signs (24h Range):  Temp:  [97.9 °F (36.6 °C)-98.9 °F (37.2 °C)] 98.9 °F (37.2 °C)  Pulse:  [] 94  Resp:  [16-18] 18  SpO2:  [95 %-100 %] 98 %  BP: (118-166)/(57-88) 118/69     Weight: (!) 137 kg (302 lb 0.5 oz)  Body mass index is 55.24 kg/m².     Physical Exam  Vitals and nursing note reviewed.   Constitutional:       General: She is not in acute distress.     Appearance: She is well-developed. She is not ill-appearing or toxic-appearing.   HENT:      Head: Normocephalic and atraumatic.   Eyes:      Extraocular Movements: Extraocular movements intact.      Pupils: Pupils are equal, round, and reactive to light.   Cardiovascular:      Rate and Rhythm: Normal rate and regular rhythm.      Heart sounds: Normal heart sounds.   Pulmonary:      Effort: Pulmonary effort is normal. No respiratory distress.   Abdominal:      General: There is no distension.   Musculoskeletal:         General: No tenderness. Normal range of motion.   Skin:     General: Skin is warm and dry.      Findings: No rash.   Neurological:      Mental Status: She is alert and  oriented to person, place, and time.      Cranial Nerves: Facial asymmetry (right facial paralysis, no fore head wrinking on eyebrow raise, asymmetrical smile, unable to close right eye) present.      Sensory: Sensory deficit (diminished sensation to right side of face) present.      Motor: No weakness.   Psychiatric:         Behavior: Behavior normal.         Thought Content: Thought content normal.         Judgment: Judgment normal.              CRANIAL NERVES     CN III, IV, VI   Pupils are equal, round, and reactive to light.       Significant Labs: All pertinent labs within the past 24 hours have been reviewed.  CBC:   Recent Labs   Lab 11/07/24 2308 11/09/24  0009   WBC 16.10* 14.14*   HGB 12.4 11.8*   HCT 38.2 39.3    381     CMP:   Recent Labs   Lab 11/07/24 2308 11/08/24  0554 11/09/24  0009     --  139   K 4.0  --  3.8     --  106   CO2 26  --  22*     --  94   BUN 11  --  9   CREATININE 0.73 0.58* 0.7   CALCIUM 9.4  --  8.9   PROT 8.6*  --  7.5   ALBUMIN 4.1  --  3.1*   BILITOT 0.3  --  0.2   ALKPHOS 73  --  73   AST 27  --  16   ALT 22  --  16   ANIONGAP 10  --  11     Lactic Acid:   Recent Labs   Lab 11/09/24  0009   LACTATE 0.8       Significant Imaging: I have reviewed all pertinent imaging results/findings within the past 24 hours.    Results for orders placed or performed during the hospital encounter of 11/07/24 (from the past 2160 hours)   CT Temporal Bone with Contrast    Narrative    EXAMINATION:  CT TEMPORAL BONE WITH CONTRAST    CLINICAL HISTORY:  Mastoiditis;    CT/nuclear cardiac exams in previous 12 months: 2    TECHNIQUE:  Axial CT images were obtained and evaluated with multiplanar reformatted images.  Iterative reconstruction technique was used.    COMPARISON:  None    FINDINGS:  Right mastoid air cells are completely opacified, and there is fluid within the right middle ear.  Right tympanic membrane appears thickened.  Left mastoid air cells are clear.   Ossicles appear symmetric and intact.  Bilateral external auditory canals are patent.  No mass identified.  No evidence of a fracture or osseous erosion.      Impression    Right mastoid air cell and middle ear fluid, suggesting possible otomastoiditis.      Electronically signed by: Jem Etienne MD  Date:    11/08/2024  Time:    01:46   CT Head Without Contrast    Narrative    EXAMINATION:  CT HEAD WITHOUT CONTRAST    CLINICAL HISTORY:  Headache, new or worsening, neuro deficit (Age 19-49y);    CT/nuclear cardiac exams in previous 12 months: 2    TECHNIQUE:  Axial CT images were obtained and evaluated with multiplanar reformatted images.  Iterative reconstruction technique was used.    COMPARISON:  CT head 09/06/2024    FINDINGS:  No intracranial hemorrhage, mass, mass effect or recent infarct evident.  Gray-white matter differentiation appears maintained.  Ventricles are not enlarged.  Mucosal thickening results in complete opacification the right-sided frontal sinus and partial opacification of the left-sided frontal sinus.  Right mastoid air cells are opacified, unchanged from the previous exam.  Small amount of right middle ear fluid suspected.  Left mastoid air cells are clear.  There is hypertrophy of the adenoids.  Calvarium is intact.      Impression    No evidence of an acute intracranial abnormality.    Right mastoid air cell and middle ear fluid, possibly secondary to otomastoiditis.      Electronically signed by: Jem Etienne MD  Date:    11/08/2024  Time:    01:34   Results for orders placed or performed during the hospital encounter of 09/06/24 (from the past 2160 hours)   CT Cervical Spine Without Contrast    Narrative    EXAMINATION:  CT CERVICAL SPINE WITHOUT CONTRAST    CLINICAL HISTORY:  Polytrauma, blunt;    TECHNIQUE:  Thin section axial images were obtained of the cervical spine. Multiplanar reformations were performed. Iterative reconstruction technique was used.    CT/Cardiac Nuclear exams in  prior 12 months: 0    COMPARISON:  None.    FINDINGS:  The cervical vertebral bodies are of normal height and alignment.  The facets are intact.  The odontoid is intact.  The pre odontoid space is normal.  The anterior and posterior arch of C1 are intact the lung apices are clear      Impression    No acute fracture or dislocation of the cervical spine      Electronically signed by: Manasa Baker MD  Date:    09/06/2024  Time:    08:27     Results for orders placed or performed during the hospital encounter of 11/07/24 (from the past 2160 hours)   MRI IAC/Temporal Bones W W/O Contrast    Impression    1. Fluid throughout the right mastoid air cells and within the right middle ear suggesting otomastoiditis. No abnormal enhancement is identified.  2. Moderate frontal sinusitis right greater than left.      Electronically signed by: Gianfranco Etienne MD  Date:    11/08/2024  Time:    10:57     Assessment/Plan:     * Acute mastoiditis of right side with facial paralysis  Leukocytosis   Tympanic membrane rupture, bilateral - chronic   - 1/4 SIRS on admission; WBC 16  - lactic acid, CRP and ESR pending   - BCX ordered   - MRI temporal bone revealed fluid throughout the right mastoid air cells and within the right middle ear suggesting otomastoiditis   - started on broad spectrum mastoiditis abx: vancomycin + metronidazole + ceftaz   - ENT consulted, appreciate recommendations  -- NPO at midnight in anticipation for mastoidectomy   -- Pain/antinausea medications prn   - daily CBC and CMP   - neuro checks q4h     Major depressive disorder, recurrent episode, moderate  Agoraphobia with panic disorder   Patient has recurrent depression which is moderate and is currently controlled.   - Will Continue anti-depressant medications. We will not consult psychiatry at this time.   - Patient does not display psychosis at this time. Continue to monitor closely and adjust plan of care as needed.  - continue wellbutrin, buspar and lexapro  -  prn klonopin and trazadone for anxiety/insomnia     Class 3 severe obesity with body mass index (BMI) of 50.0 to 59.9 in adult  Body mass index is 55.24 kg/m². Morbid obesity complicates all aspects of disease management from diagnostic modalities to treatment. Weight loss encouraged and health benefits explained to patient.    History of Bell palsy  - noted       VTE Risk Mitigation (From admission, onward)           Ordered     Reason for No Pharmacological VTE Prophylaxis  Once        Question:  Reasons:  Answer:  Patient is Ambulatory    11/08/24 2240     IP VTE HIGH RISK PATIENT  Once         11/08/24 2240     Place sequential compression device  Until discontinued         11/08/24 2240                       Ariana Pearson PA-C  Department of Hospital Medicine  [facility] - Telemetry Stepdown

## 2024-11-09 NOTE — ASSESSMENT & PLAN NOTE
Agoraphobia with panic disorder   Patient has recurrent depression which is moderate and is currently controlled.   - Will Continue anti-depressant medications. We will not consult psychiatry at this time.   - Patient does not display psychosis at this time. Continue to monitor closely and adjust plan of care as needed.  - continue wellbutrin, buspar and lexapro  - prn klonopin and trazadone for anxiety/insomnia

## 2024-11-09 NOTE — PROGRESS NOTES
[facility] - Telemetry Stepdown  Otorhinolaryngology-Head & Neck Surgery  Progress Note    Subjective:     Post-Op Info:  Procedure(s) (LRB):  MYRINGOTOMY, WITH TYMPANOSTOMY TUBE INSERTION (Right)      Hospital Day: 2     Interval History:   Patient with improvement in pain this am, but still reporting facial weakness.     Of note, she has a history of Bell's palsy on the left a couple years ago after a fall that has since resolved.    Medications:  Continuous Infusions:  Scheduled Meds:   buPROPion  150 mg Oral QAM    busPIRone  7.5 mg Oral Daily    cefTAZidime IV (PEDS and ADULTS)  2 g Intravenous Q8H    EScitalopram oxalate  10 mg Oral Daily    metroNIDAZOLE IV (PEDS and ADULTS)  500 mg Intravenous Q8H    mupirocin   Nasal BID    vancomycin (VANCOCIN) IV (PEDS and ADULTS)  2,000 mg Intravenous Q12H     PRN Meds:  Current Facility-Administered Medications:     acetaminophen, 650 mg, Oral, Q8H PRN    acetaminophen, 650 mg, Oral, Q4H PRN    albuterol-ipratropium, 3 mL, Nebulization, Q6H PRN    aluminum-magnesium hydroxide-simethicone, 30 mL, Oral, QID PRN    clonazePAM, 0.5 mg, Oral, BID PRN    dextrose 10%, 12.5 g, Intravenous, PRN    dextrose 10%, 25 g, Intravenous, PRN    glucagon (human recombinant), 1 mg, Intramuscular, PRN    glucose, 16 g, Oral, PRN    glucose, 24 g, Oral, PRN    melatonin, 6 mg, Oral, Nightly PRN    naloxone, 0.02 mg, Intravenous, PRN    ondansetron, 8 mg, Oral, Q8H PRN    sodium chloride 0.9%, 10 mL, Intravenous, Q12H PRN    traZODone, 50 mg, Oral, Nightly PRN    Pharmacy to dose Vancomycin consult, , , Once **AND** vancomycin - pharmacy to dose, , Intravenous, pharmacy to manage frequency     Review of patient's allergies indicates:   Allergen Reactions    Penicillins Itching     Pt. Reports throat itches when she takes it    Sulfa (sulfonamide antibiotics)      Objective:     Vital Signs (24h Range):  Temp:  [97.9 °F (36.6 °C)-98.9 °F (37.2 °C)] 98.5 °F (36.9 °C)  Pulse:  [88-99] 88  Resp:   [16-18] 18  SpO2:  [95 %-99 %] 97 %  BP: (118-166)/(57-88) 131/77       Lines/Drains/Airways       Peripheral Intravenous Line  Duration                  Peripheral IV - Single Lumen 11/07/24 2305 20 G Left;Posterior Forearm 1 day                     Physical Exam  NAD  Resting comfortably in bed   R EAC obstructed with cerumen, removed with curette and suction  Small defect in posterior superior R TM with purulence, cultured   Remained of TM intact with JUSTIN but does not appear purulent   R HB V/VI with incomplete eye closure and asymmetry at rest   No postauricular/mastoid type fluctuance, erythema or induration      Significant Labs:  CBC:   Recent Labs   Lab 11/09/24  0330   WBC 13.55*   RBC 4.84   HGB 12.2   HCT 40.1      MCV 83   MCH 25.2*   MCHC 30.4*       Significant Diagnostics:  CT: I have reviewed all pertinent results/findings within the past 24 hours and my personal findings are:  opacification of the R middle ear space without coalescence or evidence of abscess   Assessment/Plan:     * Acute mastoiditis of right side with facial paralysis  Liat Hernandez is a 23 y.o. female with Mastoiditis [H70.90] with associated facial nerve paralysis (house Brackmann V on the right, I on the left). Noted to have small defect in posterior superior aspect of  TM with scant purulence, cultured bedside.     -- To OR this am for R sided PE tube  -- Ok for diet after OR  -- Start ciprodex 4 gtt BID to R ear today   -- Recommend IV steroids due to facial nerve palsy  -- Agree with broad spectrum IV abx including rocephin   -- Pain/antinausea medications prn   -- Remainder of care per primary team  -- Recommend eye care for R eye    - Lubricating drops daily    - Lubricating ointment nightly or tape eye  -- Please page ENT on call with any questions or concerns             Carolin Osullivan MD  Otorhinolaryngology-Head & Neck Surgery  [facility] - Telemetry Stepdown

## 2024-11-09 NOTE — PROGRESS NOTES
"[facility] - Telemetry [facility] Medicine  Progress Note    Patient Name: Liat Hernandez  MRN: [id number]  Patient Class: IP- Inpatient   Admission Date: 11/8/2024  Length of Stay: 1 days  Attending Physician: Alexandro Kaufman MD  Primary Care Provider: Jax, Start        Subjective:     Principal Problem:Acute mastoiditis of right side with facial paralysis        HPI:  Liat Hernandez is a 23 y.o. female with chronic TM rupture, hx of Bell's palsy, MDD, panic disorder and morbid obesity being admitted to hospital medicine as a transfer from [facility] for ENT evaluation of right mastoiditis with facial paralysis. Patient reports posterior right ear pain and inner ear pain that started approximately 3 days ago. Endoreses associated gradual onset of right facial numbness and paralysis. She was evaluated [facility] for this issue prior to being [facility] [place] [facility]. Denies vision changes, fever, chills, abdominal pain, nausea, vomiting, diarrhea, chest pain or shortness of breath. Denies familiarity of symptoms - did have Bell's palsy in the past on the left side but it was not associated with ear pain/infection. Denies alcohol, tobacco or drug use.         Per  transfer note "Patient is a 23-year-old female with a history of Bell's palsy and chronic tympanic membrane ruptures that presents to the emergency department because of right-sided facial pain with associated neurological symptoms.  Per notes, 2 days prior to presentation, patient began to experience posterior right ear pain.  This now progressed into right upper and lower facial droop and tongue paresthesias.  Patient hypertensive and tachycardic on presentation.  White count 16.  H&H 12/38.  MCV 78.  Platelet 382.  CMP shows no significant electrolyte abnormalities.  Negative pregnancy test.  Negative COVID and flu test.  Blood cultures pending.  CT scan of the head and temporal bones with contrast shows evidence of " "osto-mastoiditis.  Patient given vancomycin, Rocephin.  I agreed to accept patient for transfer for ENT consultation/higher level of care"    Overview/Hospital Course:  See problem list    Interval History:   Symptoms:   Same.  Diet:  NPO.  Activity level:   Returning to normal.  Pain:  No pain in ear.      Review of Systems   Constitutional:  Negative for fever.   HENT:  Negative for sinus pain, sore throat and trouble swallowing.    Respiratory:  Negative for shortness of breath.    Cardiovascular:  Negative for chest pain.   Gastrointestinal:  Negative for abdominal pain.     Objective:     Vital Signs (Most Recent):  Temp: 98.9 °F (37.2 °C) (11/09/24 1344)  Pulse: 91 (11/09/24 1344)  Resp: 18 (11/09/24 1344)  BP: 118/64 (11/09/24 1344)  SpO2: (!) 94 % (11/09/24 1344) Vital Signs (24h Range):  Temp:  [97.9 °F (36.6 °C)-98.9 °F (37.2 °C)] 98.9 °F (37.2 °C)  Pulse:  [81-99] 91  Resp:  [11-23] 18  SpO2:  [92 %-99 %] 94 %  BP: (118-166)/(63-88) 118/64     Weight: (!) 137 kg (302 lb 0.5 oz)  Body mass index is 55.24 kg/m².    Intake/Output Summary (Last 24 hours) at 11/9/2024 1424  Last data filed at 11/9/2024 1137  Gross per 24 hour   Intake 20 ml   Output --   Net 20 ml         Physical Exam  Constitutional:       General: She is not in acute distress.  Cardiovascular:      Rate and Rhythm: Normal rate and regular rhythm.   Pulmonary:      Effort: No respiratory distress.   Musculoskeletal:      Right lower leg: No edema.      Left lower leg: No edema.   Neurological:      Mental Status: She is alert.      Comments: + Bell Palsy             Significant Labs: All pertinent labs within the past 24 hours have been reviewed.    Significant Imaging: I have reviewed all pertinent imaging results/findings within the past 24 hours.    Assessment/Plan:      * Acute mastoiditis of right side with facial paralysis  Sepsis   Tympanic membrane rupture, bilateral - chronic   MRI temporal bone revealed fluid throughout the right " mastoid air cells and within the right middle ear suggesting otomastoiditis   - BCX ordered   - started on broad spectrum mastoiditis abx: vancomycin + metronidazole + ceftaz   - ENT consulted, appreciate recommendations  -- OR today  - daily CBC and CMP , monitor vitals    Agoraphobia with panic disorder  Stable,   -- prn home benzo      Major depressive disorder, recurrent episode, moderate  Agoraphobia with panic disorder   Patient has recurrent depression which is moderate and is currently controlled.   - Will Continue anti-depressant medications. We will not consult psychiatry at this time.   - Patient does not display psychosis at this time. Continue to monitor closely and adjust plan of care as needed.  - continue wellbutrin, buspar and lexapro  - prn klonopin and trazadone for anxiety/insomnia     Class 3 severe obesity with body mass index (BMI) of 50.0 to 59.9 in adult  Body mass index is 55.24 kg/m². Morbid obesity complicates all aspects of disease management from diagnostic modalities to treatment. Weight loss encouraged and health benefits explained to patient.    History of Bell palsy  At admission endorses associated gradual onset of right facial numbness and paralysis. Did have Bell's palsy in the past on the left side but it was not associated with ear pain/infection  -- treat underlying ear infection and monitor symptoms     Sepsis  2/4 SIRS on admission; WBC 16 and tachycardia  This patient does have evidence of infective focus  My overall impression is sepsis.  Source: Respiratory  Antibiotics given-   Antibiotics (72h ago, onward)      Start     Stop Route Frequency Ordered    11/09/24 1145  ciprofloxacin-dexAMETHasone 0.3-0.1% otic suspension 4 drop         -- RIGHT EAR 2 times daily 11/09/24 1042    11/09/24 0900  mupirocin 2 % ointment         11/14/24 0859 Nasl 2 times daily 11/08/24 2258    11/09/24 0900  ceftAZIDime injection 2 g         -- IV Every 8 hours (non-standard times) 11/09/24  "0847    11/09/24 0200  vancomycin 2 g in dextrose 5 % 500 mL IVPB         -- IV Every 12 hours (non-standard times) 11/08/24 2257 11/08/24 2345  metronidazole IVPB 500 mg         -- IV Every 8 hours (non-standard times) 11/08/24 2240 11/08/24 2338  vancomycin - pharmacy to dose  (vancomycin IVPB (PEDS and ADULTS))        Placed in "And" Linked Group    -- IV pharmacy to manage frequency 11/08/24 2240          Source control achieved by: abx and surgery      VTE Risk Mitigation (From admission, onward)           Ordered     Reason for No Pharmacological VTE Prophylaxis  Once        Question:  Reasons:  Answer:  Patient is Ambulatory    11/08/24 2240     IP VTE HIGH RISK PATIENT  Once         11/08/24 2240     Place sequential compression device  Until discontinued         11/08/24 2240                    Discharge Planning   KELLY: 11/12/2024     Code Status: Full Code   Is the patient medically ready for discharge?:     Reason for patient still in hospital (select all that apply): Patient trending condition and Consult recommendations                     Alexandro Kaufman MD  Department of Hospital Medicine   [facility] - Telemetry Stepdown    "

## 2024-11-09 NOTE — SUBJECTIVE & OBJECTIVE
Medications:  Continuous Infusions:  Scheduled Meds:   cefTAZidime IV (PEDS and ADULTS)  1 g Intravenous Q8H    metroNIDAZOLE IV (PEDS and ADULTS)  500 mg Intravenous Q8H    [START ON 11/9/2024] mupirocin   Nasal BID    [START ON 11/9/2024] vancomycin (VANCOCIN) IV (PEDS and ADULTS)  2,000 mg Intravenous Q12H     PRN Meds:  Current Facility-Administered Medications:     acetaminophen, 650 mg, Oral, Q8H PRN    acetaminophen, 650 mg, Oral, Q4H PRN    albuterol-ipratropium, 3 mL, Nebulization, Q6H PRN    aluminum-magnesium hydroxide-simethicone, 30 mL, Oral, QID PRN    dextrose 10%, 12.5 g, Intravenous, PRN    dextrose 10%, 25 g, Intravenous, PRN    glucagon (human recombinant), 1 mg, Intramuscular, PRN    glucose, 16 g, Oral, PRN    glucose, 24 g, Oral, PRN    melatonin, 6 mg, Oral, Nightly PRN    naloxone, 0.02 mg, Intravenous, PRN    ondansetron, 8 mg, Oral, Q8H PRN    sodium chloride 0.9%, 10 mL, Intravenous, Q12H PRN    Pharmacy to dose Vancomycin consult, , , Once **AND** vancomycin - pharmacy to dose, , Intravenous, pharmacy to manage frequency     Current Facility-Administered Medications on File Prior to Encounter   Medication    [COMPLETED] cefTRIAXone (ROCEPHIN) 2 g in dextrose 5 % 100 mL IVPB (ready to mix)    [COMPLETED] gadobutroL (GADAVIST) injection 15 mL    [COMPLETED] iopamidoL (ISOVUE-370) injection 100 mL    [COMPLETED] vancomycin (VANCOCIN) 2,500 mg in D5W 500 mL IVPB    [DISCONTINUED] cefTRIAXone (ROCEPHIN) 1 g in dextrose 5 % 100 mL IVPB (ready to mix)    [DISCONTINUED] vancomycin (VANCOCIN) 2,000 mg in 0.9% NaCl 500 mL IVPB    [DISCONTINUED] vancomycin - pharmacy to dose     Current Outpatient Medications on File Prior to Encounter   Medication Sig    buPROPion (WELLBUTRIN XL) 150 MG TB24 tablet Take 150 mg by mouth every morning.    clonazePAM (KLONOPIN) 0.5 MG tablet Take 0.5 mg by mouth 2 (two) times daily as needed for Anxiety.    [DISCONTINUED] acyclovir (ZOVIRAX) 400 MG tablet Take 1  tablet (400 mg total) by mouth 5 (five) times daily. for 10 days    [DISCONTINUED] diclofenac (VOLTAREN) 75 MG EC tablet Take 1 tablet (75 mg total) by mouth 2 (two) times daily as needed (pain).    [DISCONTINUED] fluticasone propionate (FLONASE) 50 mcg/actuation nasal spray 1 spray (50 mcg total) by Each Nostril route 2 (two) times daily as needed for Rhinitis.    [DISCONTINUED] ibuprofen (ADVIL,MOTRIN) 600 MG tablet Take 1 tablet (600 mg total) by mouth every 6 (six) hours as needed for Pain.    [DISCONTINUED] ibuprofen (ADVIL,MOTRIN) 600 MG tablet Take 1 tablet (600 mg total) by mouth every 6 (six) hours as needed for Pain (fever).       Review of patient's allergies indicates:   Allergen Reactions    Penicillins Itching     Pt. Reports throat itches when she takes it    Sulfa (sulfonamide antibiotics)        Past Medical History:   Diagnosis Date    HTN (hypertension)      No past surgical history on file.  Family History    None       Tobacco Use    Smoking status: Never     Passive exposure: Never    Smokeless tobacco: Not on file   Substance and Sexual Activity    Alcohol use: Never    Drug use: Never    Sexual activity: Not on file     Review of Systems  Objective:     Vital Signs (Most Recent):  Temp: 98.9 °F (37.2 °C) (11/08/24 2147)  Pulse: 94 (11/08/24 2200)  Resp: 18 (11/08/24 2147)  BP: 118/69 (11/08/24 2147)  SpO2: 98 % (11/08/24 2147) Vital Signs (24h Range):  Temp:  [97.9 °F (36.6 °C)-98.9 °F (37.2 °C)] 98.9 °F (37.2 °C)  Pulse:  [] 94  Resp:  [16-18] 18  SpO2:  [95 %-100 %] 98 %  BP: (118-166)/(57-96) 118/69     Weight: (!) 137 kg (302 lb 0.5 oz)  Body mass index is 55.24 kg/m².         Physical Exam   NAD  Awake and alert  Head atraumatic   Auricles WNL AU  Nose w/ normal external appearance  Face: house brackmann V on the right. I on the left.   MMM, anterior tongue mobile, FOM soft, OP patent w/ midline uvula   Neck soft, not TTP, normal ROM, no LAD  Normal WOB, no stridor or stertor  AD:  external ear normal but tenderness with manipulation of auricle, EAC normal, TM intact without perforation, TM thickened, middle ear opacified, does not appear to have purulent effusion  Point tenderness behind the R ear auricle at mastoid           AS: external ear normal, EAC normal, TM intact without perforation, no middle ear fluid            Significant Labs:  All pertinent labs from the last 24 hours have been reviewed.    Significant Diagnostics:  I have reviewed all pertinent imaging results/findings within the past 24 hours.

## 2024-11-09 NOTE — HPI
"Liat Hernandez is a 23 y.o. female with chronic TM rupture, hx of Bell's palsy, MDD, panic disorder and morbid obesity being admitted to hospital medicine as a transfer from [facility] for ENT evaluation of right mastoiditis with facial paralysis. Patient reports posterior right ear pain and inner ear pain that started approximately 3 days ago. Endoreses associated gradual onset of right facial numbness and paralysis. She was [facility] for this issue prior to being transferred to [facility]. Denies vision changes, fever, chills, abdominal pain, nausea, vomiting, diarrhea, chest pain or shortness of breath. Denies familiarity of symptoms - did have Bell's palsy in the past on the left side but it was not associated with ear pain/infection. Denies alcohol, tobacco or drug use.         Per  transfer note "Patient is a 23-year-old female with a history of Bell's palsy and chronic tympanic membrane ruptures that presents to the emergency department because of right-sided facial pain with associated neurological symptoms.  Per notes, 2 days prior to presentation, patient began to experience posterior right ear pain.  This now progressed into right upper and lower facial droop and tongue paresthesias.  Patient hypertensive and tachycardic on presentation.  White count 16.  H&H 12/38.  MCV 78.  Platelet 382.  CMP shows no significant electrolyte abnormalities.  Negative pregnancy test.  Negative COVID and flu test.  Blood cultures pending.  CT scan of the head and temporal bones with contrast shows evidence of osto-mastoiditis.  Patient given vancomycin, Rocephin.  I agreed to accept patient for transfer for ENT consultation/higher level of care"  "

## 2024-11-09 NOTE — ASSESSMENT & PLAN NOTE
At admission endorses associated gradual onset of right facial numbness and paralysis. Did have Bell's palsy in the past on the left side but it was not associated with ear pain/infection  -- treat underlying ear infection and monitor symptoms

## 2024-11-09 NOTE — ASSESSMENT & PLAN NOTE
Body mass index is 55.24 kg/m². Morbid obesity complicates all aspects of disease management from diagnostic modalities to treatment. Weight loss encouraged and health benefits explained to patient.

## 2024-11-09 NOTE — ASSESSMENT & PLAN NOTE
Liat Hernandez is a 23 y.o. female with Mastoiditis [H70.90] with associated facial nerve paralysis (house Brackmann V on the right, I on the left). Noted to have small defect in posterior superior aspect of  TM with scant purulence, cultured bedside.     -- To OR this am for R sided PE tube  -- Ok for diet after OR  -- Start ciprodex 4 gtt BID to R ear today   -- Agree with broad spectrum IV abx including rocephin   -- Pain/antinausea medications prn   -- Remainder of care per primary team  -- Please page ENT on call with any questions or concerns

## 2024-11-09 NOTE — PLAN OF CARE
[facility] - Telemetry Stepdown  Initial Discharge Assessment       Primary Care Provider: Jax, Start    Admission Diagnosis: Mastoiditis [H70.90]    Admission Date: 11/8/2024  Expected Discharge Date: 11/12/2024         Payor: MEDICAID / Plan: [facility] / Product Type: Managed Medicaid /     Extended Emergency Contact Information  Primary Emergency Contact: Sloan Green  Address: [address] [place] [address]  Home Phone: [phone]  Relation: Mother  Secondary Emergency Contact: Clary Macias   [facility]  Mobile Phone: [phone]  Relation: Sister            No Pharmacies Listed    Initial Assessment (most recent)       Adult Discharge Assessment - 11/09/24 1052          Discharge Assessment    Assessment Type Discharge Planning Assessment     Confirmed/corrected address, phone number and insurance Yes     Confirmed Demographics Correct on Facesheet     Source of Information health record     Communicated KELLY with patient/caregiver Yes     Reason For Admission Mastoiditis                      Unable to complete initial assessment. CM will continue to follow and offer support as needed.  Cm will complete initial assessment at a later time. Discharge Plan A and Plan B have been determined by review of patient's clinical status, future medical and therapeutic needs, and coverage/benefits for post-acute care in coordination with multidisciplinary team members.  Camron Piña, [facility]    [facility]  [phone]

## 2024-11-09 NOTE — HPI
Liat Hernandez is a 23 y.o. female with past medical and surgical history as detailed below that presents to the hospital for Mastoiditis [H70.90]  on 11/8/2024. She notes she has been having pain and decreased hearing from the right ear with associated tendnerness behind the ear for the last 3 days. Started worsening and noted to have facial n palsy at outside hospital.      Denies recent voice changes, difficulty breathing/SOB, dysphagia, odynophagia, new lumps/bumps of head/neck, unintentional wt loss.  Denies recent nasal obstruction, rhinorrhea, PND, facial pain/pressure, anosmia.   Denies recent tinnitus, otorrhea, otalgia, dizziness, balance difficulties.

## 2024-11-09 NOTE — ANESTHESIA PREPROCEDURE EVALUATION
"                                                                                                             11/09/2024  Liat Hernandez is a 23 y.o., female.    Pre-operative evaluation for Procedure(s) (LRB):  MYRINGOTOMY, WITH TYMPANOSTOMY TUBE INSERTION (Right)    Liat Hernandez is a 23 y.o. female   Patient Name: Liat Hernandez  MRN: [id number]  Patient Class: IP- Inpatient  Admission Date: 11/8/2024  Attending Physician: Alexandro Kaufman MD   Primary Care Provider: Jax, Start           Patient information was obtained from patient, past medical records, and ER records.      Subjective:     Principal Problem:Acute mastoiditis of right side with facial paralysis     Chief Complaint: No chief complaint on file.        HPI: Liat Hernandez is a 23 y.o. female with chronic TM rupture, hx of Bell's palsy, MDD, panic disorder and morbid obesity being admitted to hospital medicine as a transfer from [facility] for ENT evaluation of right mastoiditis with facial paralysis. Patient reports posterior right ear pain and inner ear pain that started approximately 3 days ago. Endoreses associated gradual onset of right facial numbness and paralysis. She was evaluated at [facility] for this issue prior to being transferred to [facility]. Denies vision changes, fever, chills, abdominal pain, nausea, vomiting, diarrhea, chest pain or shortness of breath. Denies familiarity of symptoms - did have Bell's palsy in the past on the left side but it was not associated with ear pain/infection. Denies alcohol, tobacco or drug use.            Per  transfer note "Patient is a 23-year-old female with a history of Bell's palsy and chronic tympanic membrane ruptures that presents to the emergency department because of right-sided facial pain with associated neurological symptoms.  Per notes, 2 days prior to presentation, patient began to experience posterior right ear pain.  This now progressed into " "right upper and lower facial droop and tongue paresthesias.  Patient hypertensive and tachycardic on presentation.  White count 16.  H&H 12/38.  MCV 78.  Platelet 382.  CMP shows no significant electrolyte abnormalities.  Negative pregnancy test.  Negative COVID and flu test.  Blood cultures pending.  CT scan of the head and temporal bones with contrast shows evidence of osto-mastoiditis.  Patient given vancomycin, Rocephin.  I agreed to accept patient for transfer for ENT consultation/higher level of care"  LDA:     Prev airway:     Drips:     Patient Active Problem List   Diagnosis    Acute mastoiditis of right side with facial paralysis    Sepsis    Tympanic membrane rupture, bilateral    History of Bell palsy    Class 3 severe obesity with body mass index (BMI) of 50.0 to 59.9 in adult    Major depressive disorder, recurrent episode, moderate    Agoraphobia with panic disorder       Review of patient's allergies indicates:   Allergen Reactions    Penicillins Itching     Pt. Reports throat itches when she takes it    Sulfa (sulfonamide antibiotics)         No current facility-administered medications on file prior to encounter.     Current Outpatient Medications on File Prior to Encounter   Medication Sig Dispense Refill    buPROPion (WELLBUTRIN XL) 150 MG TB24 tablet Take 150 mg by mouth every morning.      clonazePAM (KLONOPIN) 0.5 MG tablet Take 0.5 mg by mouth 2 (two) times daily as needed for Anxiety.         No past surgical history on file.    Social History     Socioeconomic History    Marital status: Single   Tobacco Use    Smoking status: Never     Passive exposure: Never   Substance and Sexual Activity    Alcohol use: Never    Drug use: Never     Social Drivers of Health     Financial Resource Strain: High Risk (11/8/2024)    Overall Financial Resource Strain (CARDIA)     Difficulty of Paying Living Expenses: Hard   Food Insecurity: No Food Insecurity (11/8/2024)    Hunger Vital Sign     Worried About " "Running Out of Food in the Last Year: Never true     Ran Out of Food in the Last Year: Never true   Transportation Needs: No Transportation Needs (2024)    TRANSPORTATION NEEDS     Transportation : No   Physical Activity: Insufficiently Active (2024)    Exercise Vital Sign     Days of Exercise per Week: 2 days     Minutes of Exercise per Session: 20 min   Stress: Stress Concern Present (2024)    [facility] - Occupational Stress Questionnaire     Feeling of Stress : Rather much   Housing Stability: Low Risk  (2024)    Housing Stability Vital Sign     Unable to Pay for Housing in the Last Year: No     Homeless in the Last Year: No         Vital Signs Range (Last 24H):  Temp:  [36.6 °C (97.9 °F)-37.2 °C (98.9 °F)]   Pulse:  [88-99]   Resp:  [16-18]   BP: (118-166)/(57-88)   SpO2:  [95 %-99 %]       CBC:   Recent Labs     24  0009 24  0330   WBC 14.14* 13.55*   RBC 4.69 4.84   HGB 11.8* 12.2   HCT 39.3 40.1    391   MCV 84 83   MCH 25.2* 25.2*   MCHC 30.0* 30.4*       CMP:   Recent Labs     24  2308 24  0554 24  0009 24  0330     --  139 138   K 4.0  --  3.8 3.8     --  106 106   CO2 26  --  22* 25   BUN 11  --  9 8   CREATININE 0.73   < > 0.7 0.8     --  94 107   MG  --   --   --  2.1   PHOS  --   --   --  3.3   CALCIUM 9.4  --  8.9 9.0   ALBUMIN 4.1  --  3.1*  --    PROT 8.6*  --  7.5  --    ALKPHOS 73  --  73  --    ALT 22  --  16  --    AST 27  --  16  --    BILITOT 0.3  --  0.2  --     < > = values in this interval not displayed.       INR  No results for input(s): "PT", "INR", "PROTIME", "APTT" in the last 72 hours.        Diagnostic Studies:      EKD Echo:          Pre-op Assessment    I have reviewed the Patient Summary Reports.     I have reviewed the Nursing Notes.    I have reviewed the Medications.     Review of Systems  Anesthesia Hx:  No problems with previous Anesthesia              "   Social:  Non-Smoker       Hematology/Oncology:  Hematology Normal   Oncology Normal                                   Pulmonary:  Pulmonary Normal                       Renal/:  Renal/ Normal                 Hepatic/GI:  Hepatic/GI Normal                    Musculoskeletal:  Musculoskeletal Normal                Endocrine:  Endocrine Normal            Dermatological:  Skin Normal        Physical Exam  General: Well nourished  Bell's palsy  Airway:  Mallampati: II   Mouth Opening: Normal  Tongue: Normal  Neck ROM: Normal ROM    Dental:  Intact    Chest/Lungs:  Clear to auscultation        Anesthesia Plan  Type of Anesthesia, risks & benefits discussed:    Anesthesia Type: Gen ETT, Gen Supraglottic Airway  Intra-op Monitoring Plan: Standard ASA Monitors  Post Op Pain Control Plan: multimodal analgesia  Induction:  IV  Airway Plan: Direct  Informed Consent: Informed consent signed with the Patient and all parties understand the risks and agree with anesthesia plan.  All questions answered.   ASA Score: 3    Ready For Surgery From Anesthesia Perspective.     .

## 2024-11-09 NOTE — SUBJECTIVE & OBJECTIVE
Interval History:   Patient with improvement in pain this am, but still reporting facial weakness.     Of note, she has a history of Bell's palsy on the left a couple years ago after a fall that has since resolved.    Medications:  Continuous Infusions:  Scheduled Meds:   buPROPion  150 mg Oral QAM    busPIRone  7.5 mg Oral Daily    cefTAZidime IV (PEDS and ADULTS)  2 g Intravenous Q8H    EScitalopram oxalate  10 mg Oral Daily    metroNIDAZOLE IV (PEDS and ADULTS)  500 mg Intravenous Q8H    mupirocin   Nasal BID    vancomycin (VANCOCIN) IV (PEDS and ADULTS)  2,000 mg Intravenous Q12H     PRN Meds:  Current Facility-Administered Medications:     acetaminophen, 650 mg, Oral, Q8H PRN    acetaminophen, 650 mg, Oral, Q4H PRN    albuterol-ipratropium, 3 mL, Nebulization, Q6H PRN    aluminum-magnesium hydroxide-simethicone, 30 mL, Oral, QID PRN    clonazePAM, 0.5 mg, Oral, BID PRN    dextrose 10%, 12.5 g, Intravenous, PRN    dextrose 10%, 25 g, Intravenous, PRN    glucagon (human recombinant), 1 mg, Intramuscular, PRN    glucose, 16 g, Oral, PRN    glucose, 24 g, Oral, PRN    melatonin, 6 mg, Oral, Nightly PRN    naloxone, 0.02 mg, Intravenous, PRN    ondansetron, 8 mg, Oral, Q8H PRN    sodium chloride 0.9%, 10 mL, Intravenous, Q12H PRN    traZODone, 50 mg, Oral, Nightly PRN    Pharmacy to dose Vancomycin consult, , , Once **AND** vancomycin - pharmacy to dose, , Intravenous, pharmacy to manage frequency     Review of patient's allergies indicates:   Allergen Reactions    Penicillins Itching     Pt. Reports throat itches when she takes it    Sulfa (sulfonamide antibiotics)      Objective:     Vital Signs (24h Range):  Temp:  [97.9 °F (36.6 °C)-98.9 °F (37.2 °C)] 98.5 °F (36.9 °C)  Pulse:  [88-99] 88  Resp:  [16-18] 18  SpO2:  [95 %-99 %] 97 %  BP: (118-166)/(57-88) 131/77       Lines/Drains/Airways       Peripheral Intravenous Line  Duration                  Peripheral IV - Single Lumen 11/07/24 2305 20 G Left;Posterior  Forearm 1 day                     Physical Exam  NAD  Resting comfortably in bed   R EAC obstructed with cerumen, removed with curette and suction  Small defect in posterior superior R TM with purulence, cultured   Remained of TM intact with JUSTIN but does not appear purulent   R HB VI/VI with incomplete eye closure and asymmetry at rest   No postauricular/mastoid type fluctuance, erythema or induration      Significant Labs:  CBC:   Recent Labs   Lab 11/09/24  0330   WBC 13.55*   RBC 4.84   HGB 12.2   HCT 40.1      MCV 83   MCH 25.2*   MCHC 30.4*       Significant Diagnostics:  CT: I have reviewed all pertinent results/findings within the past 24 hours and my personal findings are:  opacification of the R middle ear space without coalescence or evidence of abscess

## 2024-11-09 NOTE — NURSING TRANSFER
Nursing Transfer Note      11/9/2024   1:14 PM    Nurse giving handoff: SHARRON Mckeon   Nurse receiving handoff:SHARRON Navarro    Reason patient is being transferred: post anesthesia     Transfer From: PACU to [facility]    Transfer via bed    Transfer with n/a    Transported by PCT x 2    Transfer Vital Signs:  Blood Pressure:135/63  Heart Rate:82  O2:95  Temperature:98.4  Respirations:12    Medicines sent: cipro ear drops    Any special needs or follow-up needed: none    Patient belongings transferred with patient:  n/a    Chart send with patient: Yes    Patient reassessed at: 1215

## 2024-11-09 NOTE — CONSULTS
[facility] - Telemetry Stepdown  Otorhinolaryngology-Head & Neck Surgery  Consult Note    Patient Name: Liat Hernandez  MRN: [id number]  Code Status: Full Code  Admission Date: 11/8/2024  Hospital Length of Stay: 0 days  Attending Physician: Alexandro Kaufman MD  Primary Care Provider: Jax, Start    Patient information was obtained from patient and ER records.     Inpatient consult to ENT  Consult performed by: Ralph Raza MD  Consult ordered by: Ariana Pearson PA-C        Subjective:     Chief Complaint/Reason for Admission: R mastoiditis     History of Present Illness: Liat Hernandez is a 23 y.o. female with past medical and surgical history as detailed below that presents to the hospital for Mastoiditis [H70.90]  on 11/8/2024. She notes she has been having pain and decreased hearing from the right ear with associated tendnerness behind the ear for the last 3 days. Started worsening and noted to have facial n palsy at outside hospital.      Denies recent voice changes, difficulty breathing/SOB, dysphagia, odynophagia, new lumps/bumps of head/neck, unintentional wt loss.  Denies recent nasal obstruction, rhinorrhea, PND, facial pain/pressure, anosmia.   Denies recent tinnitus, otorrhea, otalgia, dizziness, balance difficulties.         Medications:  Continuous Infusions:  Scheduled Meds:   cefTAZidime IV (PEDS and ADULTS)  1 g Intravenous Q8H    metroNIDAZOLE IV (PEDS and ADULTS)  500 mg Intravenous Q8H    [START ON 11/9/2024] mupirocin   Nasal BID    [START ON 11/9/2024] vancomycin (VANCOCIN) IV (PEDS and ADULTS)  2,000 mg Intravenous Q12H     PRN Meds:  Current Facility-Administered Medications:     acetaminophen, 650 mg, Oral, Q8H PRN    acetaminophen, 650 mg, Oral, Q4H PRN    albuterol-ipratropium, 3 mL, Nebulization, Q6H PRN    aluminum-magnesium hydroxide-simethicone, 30 mL, Oral, QID PRN    dextrose 10%, 12.5 g, Intravenous, PRN    dextrose 10%, 25 g, Intravenous, PRN     glucagon (human recombinant), 1 mg, Intramuscular, PRN    glucose, 16 g, Oral, PRN    glucose, 24 g, Oral, PRN    melatonin, 6 mg, Oral, Nightly PRN    naloxone, 0.02 mg, Intravenous, PRN    ondansetron, 8 mg, Oral, Q8H PRN    sodium chloride 0.9%, 10 mL, Intravenous, Q12H PRN    Pharmacy to dose Vancomycin consult, , , Once **AND** vancomycin - pharmacy to dose, , Intravenous, pharmacy to manage frequency     Current Facility-Administered Medications on File Prior to Encounter   Medication    [COMPLETED] cefTRIAXone (ROCEPHIN) 2 g in dextrose 5 % 100 mL IVPB (ready to mix)    [COMPLETED] gadobutroL (GADAVIST) injection 15 mL    [COMPLETED] iopamidoL (ISOVUE-370) injection 100 mL    [COMPLETED] vancomycin (VANCOCIN) 2,500 mg in D5W 500 mL IVPB    [DISCONTINUED] cefTRIAXone (ROCEPHIN) 1 g in dextrose 5 % 100 mL IVPB (ready to mix)    [DISCONTINUED] vancomycin (VANCOCIN) 2,000 mg in 0.9% NaCl 500 mL IVPB    [DISCONTINUED] vancomycin - pharmacy to dose     Current Outpatient Medications on File Prior to Encounter   Medication Sig    buPROPion (WELLBUTRIN XL) 150 MG TB24 tablet Take 150 mg by mouth every morning.    clonazePAM (KLONOPIN) 0.5 MG tablet Take 0.5 mg by mouth 2 (two) times daily as needed for Anxiety.    [DISCONTINUED] acyclovir (ZOVIRAX) 400 MG tablet Take 1 tablet (400 mg total) by mouth 5 (five) times daily. for 10 days    [DISCONTINUED] diclofenac (VOLTAREN) 75 MG EC tablet Take 1 tablet (75 mg total) by mouth 2 (two) times daily as needed (pain).    [DISCONTINUED] fluticasone propionate (FLONASE) 50 mcg/actuation nasal spray 1 spray (50 mcg total) by Each Nostril route 2 (two) times daily as needed for Rhinitis.    [DISCONTINUED] ibuprofen (ADVIL,MOTRIN) 600 MG tablet Take 1 tablet (600 mg total) by mouth every 6 (six) hours as needed for Pain.    [DISCONTINUED] ibuprofen (ADVIL,MOTRIN) 600 MG tablet Take 1 tablet (600 mg total) by mouth every 6 (six) hours as needed for Pain (fever).       Review of  patient's allergies indicates:   Allergen Reactions    Penicillins Itching     Pt. Reports throat itches when she takes it    Sulfa (sulfonamide antibiotics)        Past Medical History:   Diagnosis Date    HTN (hypertension)      No past surgical history on file.  Family History    None       Tobacco Use    Smoking status: Never     Passive exposure: Never    Smokeless tobacco: Not on file   Substance and Sexual Activity    Alcohol use: Never    Drug use: Never    Sexual activity: Not on file     Review of Systems  Objective:     Vital Signs (Most Recent):  Temp: 98.9 °F (37.2 °C) (11/08/24 2147)  Pulse: 94 (11/08/24 2200)  Resp: 18 (11/08/24 2147)  BP: 118/69 (11/08/24 2147)  SpO2: 98 % (11/08/24 2147) Vital Signs (24h Range):  Temp:  [97.9 °F (36.6 °C)-98.9 °F (37.2 °C)] 98.9 °F (37.2 °C)  Pulse:  [] 94  Resp:  [16-18] 18  SpO2:  [95 %-100 %] 98 %  BP: (118-166)/(57-96) 118/69     Weight: (!) 137 kg (302 lb 0.5 oz)  Body mass index is 55.24 kg/m².         Physical Exam   NAD  Awake and alert  Head atraumatic   Auricles WNL AU  Nose w/ normal external appearance  Face: house brackmann V on the right. I on the left.   MMM, anterior tongue mobile, FOM soft, OP patent w/ midline uvula   Neck soft, not TTP, normal ROM, no LAD  Normal WOB, no stridor or stertor  AD: external ear normal but tenderness with manipulation of auricle, EAC normal, TM intact without perforation, TM thickened, middle ear opacified, purulent fluid seen eminating from attic  Point tenderness behind the R ear auricle at mastoid           AS: external ear normal, EAC normal, TM intact without perforation, no middle ear fluid            Cerumen Removal    Indication: Cerumen impaction, right    Impacted cerumen was present in the Direct Ophthalmoscopy  and lateral ear canal on both sides.  Informed consent was obtained prior to proceeding.  The impaction was completely removed under direct visualization using a suction cannula.  I performed  the procedure myself.  There was no significant trauma.    The patient tolerated the procedure well.            Significant Labs:  All pertinent labs from the last 24 hours have been reviewed.    Significant Diagnostics:  I have reviewed all pertinent imaging results/findings within the past 24 hours.    Assessment/Plan:     * Acute mastoiditis of right side with facial paralysis  Liat Hernandez is a 23 y.o. female with Mastoiditis [H70.90] with associated facial nerve paralysis (house Brackmann V on the right, I on the left).       The patient initially transferred for possible mastoiditis.  Has a history of right sclerotic mastoid.  Interestingly has a history of the left-sided Bell's palsy.  On bedside exam ear with exquisite tenderness in the external auditory canal.  There was no obvious mastoid swelling or displacement of the auricle.  She has HB 5/6 on the right.     Her ear was examined at bedside that showed some purulence emanating from the adequate on the right.  There was no mastoid tenderness.  Discussed with the patient the bedside about proceeding with evaluation of the anesthesia and possible tube placement in the right.  Cultures were obtained of her right ear canal was start on topical antibiotic drops after tube placement.          VTE Risk Mitigation (From admission, onward)           Ordered     Reason for No Pharmacological VTE Prophylaxis  Once        Question:  Reasons:  Answer:  Patient is Ambulatory    11/08/24 2240     IP VTE HIGH RISK PATIENT  Once         11/08/24 2240     Place sequential compression device  Until discontinued         11/08/24 2240                    Thank you for your consult. I will follow-up with patient. Please contact us if you have any additional questions.    Ralph Coleman MD  Otorhinolaryngology-Head & Neck Surgery  [facility] - Telemetry Stepdown

## 2024-11-09 NOTE — SUBJECTIVE & OBJECTIVE
Past Medical History:   Diagnosis Date    HTN (hypertension)        No past surgical history on file.    Review of patient's allergies indicates:   Allergen Reactions    Penicillins Itching     Pt. Reports throat itches when she takes it    Sulfa (sulfonamide antibiotics)        Current Facility-Administered Medications on File Prior to Encounter   Medication    [COMPLETED] cefTRIAXone (ROCEPHIN) 2 g in dextrose 5 % 100 mL IVPB (ready to mix)    [COMPLETED] gadobutroL (GADAVIST) injection 15 mL    [COMPLETED] vancomycin (VANCOCIN) 2,500 mg in D5W 500 mL IVPB    [DISCONTINUED] cefTRIAXone (ROCEPHIN) 1 g in dextrose 5 % 100 mL IVPB (ready to mix)    [DISCONTINUED] vancomycin (VANCOCIN) 2,000 mg in 0.9% NaCl 500 mL IVPB    [DISCONTINUED] vancomycin - pharmacy to dose     Current Outpatient Medications on File Prior to Encounter   Medication Sig    buPROPion (WELLBUTRIN XL) 150 MG TB24 tablet Take 150 mg by mouth every morning.    clonazePAM (KLONOPIN) 0.5 MG tablet Take 0.5 mg by mouth 2 (two) times daily as needed for Anxiety.    traZODone (DESYREL) 50 MG tablet nightly as needed.    busPIRone (BUSPAR) 7.5 MG tablet Take 7.5 mg by mouth once daily.    EScitalopram oxalate (LEXAPRO) 10 MG tablet Take 10 mg by mouth once daily.     Family History    None       Tobacco Use    Smoking status: Never     Passive exposure: Never    Smokeless tobacco: Not on file   Substance and Sexual Activity    Alcohol use: Never    Drug use: Never    Sexual activity: Not on file     Review of Systems   Constitutional:  Negative for activity change, chills and fever.   HENT:  Positive for ear pain. Negative for trouble swallowing.    Eyes:  Negative for photophobia and visual disturbance.   Respiratory:  Negative for cough, chest tightness and shortness of breath.    Cardiovascular:  Negative for chest pain, palpitations and leg swelling.   Gastrointestinal:  Negative for abdominal pain, constipation, diarrhea, nausea and vomiting.    Genitourinary:  Negative for dysuria, frequency and hematuria.   Musculoskeletal:  Negative for back pain, gait problem and neck pain.   Skin:  Negative for rash and wound.   Neurological:  Positive for facial asymmetry, weakness and numbness. Negative for dizziness, syncope, speech difficulty and light-headedness.   Psychiatric/Behavioral:  Negative for agitation and confusion. The patient is not nervous/anxious.      Objective:     Vital Signs (Most Recent):  Temp: 98.9 °F (37.2 °C) (11/08/24 2147)  Pulse: 94 (11/08/24 2200)  Resp: 18 (11/08/24 2147)  BP: 118/69 (11/08/24 2147)  SpO2: 98 % (11/08/24 2147) Vital Signs (24h Range):  Temp:  [97.9 °F (36.6 °C)-98.9 °F (37.2 °C)] 98.9 °F (37.2 °C)  Pulse:  [] 94  Resp:  [16-18] 18  SpO2:  [95 %-100 %] 98 %  BP: (118-166)/(57-88) 118/69     Weight: (!) 137 kg (302 lb 0.5 oz)  Body mass index is 55.24 kg/m².     Physical Exam  Vitals and nursing note reviewed.   Constitutional:       General: She is not in acute distress.     Appearance: She is well-developed. She is not ill-appearing or toxic-appearing.   HENT:      Head: Normocephalic and atraumatic.   Eyes:      Extraocular Movements: Extraocular movements intact.      Pupils: Pupils are equal, round, and reactive to light.   Cardiovascular:      Rate and Rhythm: Normal rate and regular rhythm.      Heart sounds: Normal heart sounds.   Pulmonary:      Effort: Pulmonary effort is normal. No respiratory distress.   Abdominal:      General: There is no distension.   Musculoskeletal:         General: No tenderness. Normal range of motion.   Skin:     General: Skin is warm and dry.      Findings: No rash.   Neurological:      Mental Status: She is alert and oriented to person, place, and time.      Cranial Nerves: Facial asymmetry (right facial paralysis, no fore head wrinking on eyebrow raise, asymmetrical smile, unable to close right eye) present.      Sensory: Sensory deficit (diminished sensation to right side  of face) present.      Motor: No weakness.   Psychiatric:         Behavior: Behavior normal.         Thought Content: Thought content normal.         Judgment: Judgment normal.              CRANIAL NERVES     CN III, IV, VI   Pupils are equal, round, and reactive to light.       Significant Labs: All pertinent labs within the past 24 hours have been reviewed.  CBC:   Recent Labs   Lab 11/07/24 2308 11/09/24  0009   WBC 16.10* 14.14*   HGB 12.4 11.8*   HCT 38.2 39.3    381     CMP:   Recent Labs   Lab 11/07/24 2308 11/08/24  0554 11/09/24  0009     --  139   K 4.0  --  3.8     --  106   CO2 26  --  22*     --  94   BUN 11  --  9   CREATININE 0.73 0.58* 0.7   CALCIUM 9.4  --  8.9   PROT 8.6*  --  7.5   ALBUMIN 4.1  --  3.1*   BILITOT 0.3  --  0.2   ALKPHOS 73  --  73   AST 27  --  16   ALT 22  --  16   ANIONGAP 10  --  11     Lactic Acid:   Recent Labs   Lab 11/09/24  0009   LACTATE 0.8       Significant Imaging: I have reviewed all pertinent imaging results/findings within the past 24 hours.    Results for orders placed or performed during the hospital encounter of 11/07/24 (from the past 2160 hours)   CT Temporal Bone with Contrast    Narrative    EXAMINATION:  CT TEMPORAL BONE WITH CONTRAST    CLINICAL HISTORY:  Mastoiditis;    CT/nuclear cardiac exams in previous 12 months: 2    TECHNIQUE:  Axial CT images were obtained and evaluated with multiplanar reformatted images.  Iterative reconstruction technique was used.    COMPARISON:  None    FINDINGS:  Right mastoid air cells are completely opacified, and there is fluid within the right middle ear.  Right tympanic membrane appears thickened.  Left mastoid air cells are clear.  Ossicles appear symmetric and intact.  Bilateral external auditory canals are patent.  No mass identified.  No evidence of a fracture or osseous erosion.      Impression    Right mastoid air cell and middle ear fluid, suggesting possible  otomastoiditis.      Electronically signed by: Jem Etienne MD  Date:    11/08/2024  Time:    01:46   CT Head Without Contrast    Narrative    EXAMINATION:  CT HEAD WITHOUT CONTRAST    CLINICAL HISTORY:  Headache, new or worsening, neuro deficit (Age 19-49y);    CT/nuclear cardiac exams in previous 12 months: 2    TECHNIQUE:  Axial CT images were obtained and evaluated with multiplanar reformatted images.  Iterative reconstruction technique was used.    COMPARISON:  CT head 09/06/2024    FINDINGS:  No intracranial hemorrhage, mass, mass effect or recent infarct evident.  Gray-white matter differentiation appears maintained.  Ventricles are not enlarged.  Mucosal thickening results in complete opacification the right-sided frontal sinus and partial opacification of the left-sided frontal sinus.  Right mastoid air cells are opacified, unchanged from the previous exam.  Small amount of right middle ear fluid suspected.  Left mastoid air cells are clear.  There is hypertrophy of the adenoids.  Calvarium is intact.      Impression    No evidence of an acute intracranial abnormality.    Right mastoid air cell and middle ear fluid, possibly secondary to otomastoiditis.      Electronically signed by: Jem Etienne MD  Date:    11/08/2024  Time:    01:34   Results for orders placed or performed during the hospital encounter of 09/06/24 (from the past 2160 hours)   CT Cervical Spine Without Contrast    Narrative    EXAMINATION:  CT CERVICAL SPINE WITHOUT CONTRAST    CLINICAL HISTORY:  Polytrauma, blunt;    TECHNIQUE:  Thin section axial images were obtained of the cervical spine. Multiplanar reformations were performed. Iterative reconstruction technique was used.    CT/Cardiac Nuclear exams in prior 12 months: 0    COMPARISON:  None.    FINDINGS:  The cervical vertebral bodies are of normal height and alignment.  The facets are intact.  The odontoid is intact.  The pre odontoid space is normal.  The anterior and posterior arch of  C1 are intact the lung apices are clear      Impression    No acute fracture or dislocation of the cervical spine      Electronically signed by: Manasa Baker MD  Date:    09/06/2024  Time:    08:27     Results for orders placed or performed during the hospital encounter of 11/07/24 (from the past 2160 hours)   MRI IAC/Temporal Bones W W/O Contrast    Impression    1. Fluid throughout the right mastoid air cells and within the right middle ear suggesting otomastoiditis. No abnormal enhancement is identified.  2. Moderate frontal sinusitis right greater than left.      Electronically signed by: Gianfranco Etienne MD  Date:    11/08/2024  Time:    10:57

## 2024-11-10 LAB
ANION GAP SERPL CALC-SCNC: 11 MMOL/L (ref 8–16)
BASOPHILS # BLD AUTO: 0.05 K/UL (ref 0–0.2)
BASOPHILS NFR BLD: 0.4 % (ref 0–1.9)
BUN SERPL-MCNC: 9 MG/DL (ref 6–20)
CALCIUM SERPL-MCNC: 9 MG/DL (ref 8.7–10.5)
CHLORIDE SERPL-SCNC: 103 MMOL/L (ref 95–110)
CO2 SERPL-SCNC: 24 MMOL/L (ref 23–29)
CREAT SERPL-MCNC: 0.8 MG/DL (ref 0.5–1.4)
DIFFERENTIAL METHOD BLD: ABNORMAL
EOSINOPHIL # BLD AUTO: 0.2 K/UL (ref 0–0.5)
EOSINOPHIL NFR BLD: 1.5 % (ref 0–8)
ERYTHROCYTE [DISTWIDTH] IN BLOOD BY AUTOMATED COUNT: 14.3 % (ref 11.5–14.5)
EST. GFR  (NO RACE VARIABLE): >60 ML/MIN/1.73 M^2
GLUCOSE SERPL-MCNC: 93 MG/DL (ref 70–110)
HCT VFR BLD AUTO: 39.3 % (ref 37–48.5)
HGB BLD-MCNC: 12.2 G/DL (ref 12–16)
IMM GRANULOCYTES # BLD AUTO: 0.04 K/UL (ref 0–0.04)
IMM GRANULOCYTES NFR BLD AUTO: 0.3 % (ref 0–0.5)
LYMPHOCYTES # BLD AUTO: 2.6 K/UL (ref 1–4.8)
LYMPHOCYTES NFR BLD: 18.8 % (ref 18–48)
MAGNESIUM SERPL-MCNC: 2.1 MG/DL (ref 1.6–2.6)
MCH RBC QN AUTO: 25.3 PG (ref 27–31)
MCHC RBC AUTO-ENTMCNC: 31 G/DL (ref 32–36)
MCV RBC AUTO: 82 FL (ref 82–98)
MONOCYTES # BLD AUTO: 1.1 K/UL (ref 0.3–1)
MONOCYTES NFR BLD: 8 % (ref 4–15)
NEUTROPHILS # BLD AUTO: 9.6 K/UL (ref 1.8–7.7)
NEUTROPHILS NFR BLD: 71 % (ref 38–73)
NRBC BLD-RTO: 0 /100 WBC
PHOSPHATE SERPL-MCNC: 3.4 MG/DL (ref 2.7–4.5)
PLATELET # BLD AUTO: 396 K/UL (ref 150–450)
PMV BLD AUTO: 9.8 FL (ref 9.2–12.9)
POTASSIUM SERPL-SCNC: 3.7 MMOL/L (ref 3.5–5.1)
RBC # BLD AUTO: 4.82 M/UL (ref 4–5.4)
SODIUM SERPL-SCNC: 138 MMOL/L (ref 136–145)
WBC # BLD AUTO: 13.53 K/UL (ref 3.9–12.7)

## 2024-11-10 PROCEDURE — 83735 ASSAY OF MAGNESIUM: CPT

## 2024-11-10 PROCEDURE — 25000003 PHARM REV CODE 250: Performed by: HOSPITALIST

## 2024-11-10 PROCEDURE — 25000003 PHARM REV CODE 250

## 2024-11-10 PROCEDURE — 20600001 HC STEP DOWN PRIVATE ROOM

## 2024-11-10 PROCEDURE — 80048 BASIC METABOLIC PNL TOTAL CA: CPT

## 2024-11-10 PROCEDURE — 84100 ASSAY OF PHOSPHORUS: CPT

## 2024-11-10 PROCEDURE — 85025 COMPLETE CBC W/AUTO DIFF WBC: CPT

## 2024-11-10 PROCEDURE — 63600175 PHARM REV CODE 636 W HCPCS: Mod: JZ,JG

## 2024-11-10 PROCEDURE — 36415 COLL VENOUS BLD VENIPUNCTURE: CPT

## 2024-11-10 PROCEDURE — 63600175 PHARM REV CODE 636 W HCPCS: Performed by: HOSPITALIST

## 2024-11-10 RX ORDER — PREDNISONE 20 MG/1
20 TABLET ORAL DAILY
Status: DISCONTINUED | OUTPATIENT
Start: 2024-11-20 | End: 2024-11-11 | Stop reason: HOSPADM

## 2024-11-10 RX ORDER — PREDNISONE 20 MG/1
40 TABLET ORAL DAILY
Status: DISCONTINUED | OUTPATIENT
Start: 2024-11-15 | End: 2024-11-11 | Stop reason: HOSPADM

## 2024-11-10 RX ADMIN — VANCOMYCIN HYDROCHLORIDE 1500 MG: 1.5 INJECTION, POWDER, LYOPHILIZED, FOR SOLUTION INTRAVENOUS at 12:11

## 2024-11-10 RX ADMIN — MUPIROCIN: 20 OINTMENT TOPICAL at 08:11

## 2024-11-10 RX ADMIN — MUPIROCIN: 20 OINTMENT TOPICAL at 10:11

## 2024-11-10 RX ADMIN — CEFTAZIDIME 2 G: 2 INJECTION, POWDER, FOR SOLUTION INTRAVENOUS at 11:11

## 2024-11-10 RX ADMIN — CEFTAZIDIME 2 G: 2 INJECTION, POWDER, FOR SOLUTION INTRAVENOUS at 02:11

## 2024-11-10 RX ADMIN — BUPROPION HYDROCHLORIDE 150 MG: 150 TABLET, EXTENDED RELEASE ORAL at 06:11

## 2024-11-10 RX ADMIN — ESCITALOPRAM 10 MG: 5 TABLET, FILM COATED ORAL at 12:11

## 2024-11-10 RX ADMIN — METRONIDAZOLE 500 MG: 5 INJECTION, SOLUTION INTRAVENOUS at 04:11

## 2024-11-10 RX ADMIN — VANCOMYCIN HYDROCHLORIDE 1500 MG: 1.5 INJECTION, POWDER, LYOPHILIZED, FOR SOLUTION INTRAVENOUS at 06:11

## 2024-11-10 RX ADMIN — METRONIDAZOLE 500 MG: 5 INJECTION, SOLUTION INTRAVENOUS at 11:11

## 2024-11-10 RX ADMIN — CEFTAZIDIME 2 G: 2 INJECTION, POWDER, FOR SOLUTION INTRAVENOUS at 04:11

## 2024-11-10 RX ADMIN — BUSPIRONE HYDROCHLORIDE 7.5 MG: 5 TABLET ORAL at 11:11

## 2024-11-10 RX ADMIN — CIPROFLOXACIN AND DEXAMETHASONE 4 DROP: 3; 1 SUSPENSION/ DROPS AURICULAR (OTIC) at 08:11

## 2024-11-10 RX ADMIN — PREDNISONE 60 MG: 50 TABLET ORAL at 11:11

## 2024-11-10 RX ADMIN — CIPROFLOXACIN AND DEXAMETHASONE 4 DROP: 3; 1 SUSPENSION/ DROPS AURICULAR (OTIC) at 11:11

## 2024-11-10 NOTE — PLAN OF CARE
Problem: Adult Inpatient Plan of Care  Goal: Absence of Hospital-Acquired Illness or Injury  Outcome: Progressing  Goal: Optimal Comfort and Wellbeing  Outcome: Progressing  Goal: Readiness for Transition of Care  Outcome: Progressing     Problem: Bariatric Environmental Safety  Goal: Safety Maintained with Care  Outcome: Progressing

## 2024-11-10 NOTE — ASSESSMENT & PLAN NOTE
Sepsis   Tympanic membrane rupture, bilateral - chronic   MRI temporal bone revealed fluid throughout the right mastoid air cells and within the right middle ear suggesting otomastoiditis   - started on broad spectrum mastoiditis abx: vancomycin + metronidazole + ceftaz   - ENT consulted, appreciate recommendations.  They placed tube on 11/9 with exudate drained.  F/u cultures  - daily CBC and CMP , monitor vitals

## 2024-11-10 NOTE — ASSESSMENT & PLAN NOTE
At admission endorses associated gradual onset of right facial numbness and paralysis. Did have Bell's palsy in the past on the left side but it was not associated with ear pain/infection  -- treat underlying ear infection and monitor symptoms   -- steroids x 2 weeks

## 2024-11-10 NOTE — PLAN OF CARE
Problem: Adult Inpatient Plan of Care  Goal: Plan of Care Review  Outcome: Progressing  Goal: Patient-Specific Goal (Individualized)  Outcome: Progressing  Goal: Absence of Hospital-Acquired Illness or Injury  Outcome: Progressing  Goal: Optimal Comfort and Wellbeing  Outcome: Progressing  Goal: Readiness for Transition of Care  Outcome: Progressing     Problem: Bariatric Environmental Safety  Goal: Safety Maintained with Care  Outcome: Progressing     Problem: Infection  Goal: Absence of Infection Signs and Symptoms  Outcome: Progressing     Problem: Sepsis/Septic Shock  Goal: Optimal Coping  Outcome: Progressing  Goal: Absence of Bleeding  Outcome: Progressing  Goal: Blood Glucose Level Within Targeted Range  Outcome: Progressing  Goal: Absence of Infection Signs and Symptoms  Outcome: Progressing  Goal: Optimal Nutrition Intake  Outcome: Progressing     Notified MD Mandeep that pt's ear drained small amount of small yellow fluid during shift. No new orders were given.

## 2024-11-10 NOTE — ASSESSMENT & PLAN NOTE
Liat Hernandez is a 23 y.o. female with Mastoiditis [H70.90] with associated facial nerve paralysis (house Brackmann V on the right, I on the left). Noted to have small defect in posterior superior aspect of  TM with scant purulence, cultured bedside. Now s/p OR for R P tube.     -- Start ciprodex 4 gtt BID to R ear today   -- Recommend high dose po steroid tape over the next two weeks   -- Agree with broad spectrum IV abx including rocephin   -- Pain/antinausea medications PRN   -- Remainder of care per primary team  -- Please page ENT on call with any questions or concerns

## 2024-11-10 NOTE — SUBJECTIVE & OBJECTIVE
Interval History:   Symptoms:   face feels less swollen and numb.  Diet:  tolerating diet.  Activity level:   Returning to normal.  Pain:  No pain in ear.      Review of Systems   Constitutional:  Negative for fever.   HENT:  Negative for sinus pain, sore throat and trouble swallowing.    Respiratory:  Negative for shortness of breath.    Cardiovascular:  Negative for chest pain.   Gastrointestinal:  Negative for abdominal pain.     Objective:     Vital Signs (Most Recent):  Temp: 98.6 °F (37 °C) (11/10/24 0406)  Pulse: 100 (11/10/24 0406)  Resp: 18 (11/10/24 0406)  BP: (!) 144/84 (11/10/24 0406)  SpO2: 97 % (11/10/24 0406) Vital Signs (24h Range):  Temp:  [97.2 °F (36.2 °C)-98.9 °F (37.2 °C)] 98.6 °F (37 °C)  Pulse:  [] 100  Resp:  [17-18] 18  SpO2:  [94 %-97 %] 97 %  BP: (118-144)/(64-89) 144/84     Weight: (!) 137 kg (302 lb 0.5 oz)  Body mass index is 55.24 kg/m².    Intake/Output Summary (Last 24 hours) at 11/10/2024 1316  Last data filed at 11/9/2024 2235  Gross per 24 hour   Intake --   Output 1 ml   Net -1 ml         Physical Exam  Constitutional:       General: She is not in acute distress.  Cardiovascular:      Rate and Rhythm: Normal rate and regular rhythm.   Pulmonary:      Effort: No respiratory distress.   Musculoskeletal:      Right lower leg: No edema.      Left lower leg: No edema.   Neurological:      Mental Status: She is alert.      Comments: + Bell Palsy             Significant Labs: All pertinent labs within the past 24 hours have been reviewed.    Significant Imaging: I have reviewed all pertinent imaging results/findings within the past 24 hours.

## 2024-11-10 NOTE — PROGRESS NOTES
"[facility] - [facility] Medicine  Progress Note    Patient Name: Liat Hernandez  MRN: [id number]  Patient Class: IP- Inpatient   Admission Date: 11/8/2024  Length of Stay: 2 days  Attending Physician: Alexandro Kaufman MD  Primary Care Provider: Jax, Start        Subjective:     Principal Problem:Acute mastoiditis of right side with facial paralysis        HPI:  Liat Hernandez is a 23 y.o. female with chronic TM rupture, hx of Bell's palsy, MDD, panic disorder and morbid obesity being admitted to hospital medicine as a transfer from [facility] for ENT evaluation of right mastoiditis with facial paralysis. Patient reports posterior right ear pain and inner ear pain that started approximately 3 days ago. Endoreses associated gradual onset of right facial numbness and paralysis. She was evaluated at [facility] ago for this issue prior to being transferred to [facility]. Denies vision changes, fever, chills, abdominal pain, nausea, vomiting, diarrhea, chest pain or shortness of breath. Denies familiarity of symptoms - did have Bell's palsy in the past on the left side but it was not associated with ear pain/infection. Denies alcohol, tobacco or drug use.         Per  transfer note "Patient is a 23-year-old female with a history of Bell's palsy and chronic tympanic membrane ruptures that presents to the emergency department because of right-sided facial pain with associated neurological symptoms.  Per notes, 2 days prior to presentation, patient began to experience posterior right ear pain.  This now progressed into right upper and lower facial droop and tongue paresthesias.  Patient hypertensive and tachycardic on presentation.  White count 16.  H&H 12/38.  MCV 78.  Platelet 382.  CMP shows no significant electrolyte abnormalities.  Negative pregnancy test.  Negative COVID and flu test.  Blood cultures pending.  CT scan of the head and temporal bones with contrast shows evidence of " "osto-mastoiditis.  Patient given vancomycin, Rocephin.  I agreed to accept patient for transfer for ENT consultation/higher level of care"    Overview/Hospital Course:  See problem list    Interval History:   Symptoms:   face feels less swollen and numb.  Diet:  tolerating diet.  Activity level:   Returning to normal.  Pain:  No pain in ear.      Review of Systems   Constitutional:  Negative for fever.   HENT:  Negative for sinus pain, sore throat and trouble swallowing.    Respiratory:  Negative for shortness of breath.    Cardiovascular:  Negative for chest pain.   Gastrointestinal:  Negative for abdominal pain.     Objective:     Vital Signs (Most Recent):  Temp: 98.6 °F (37 °C) (11/10/24 0406)  Pulse: 100 (11/10/24 0406)  Resp: 18 (11/10/24 0406)  BP: (!) 144/84 (11/10/24 0406)  SpO2: 97 % (11/10/24 0406) Vital Signs (24h Range):  Temp:  [97.2 °F (36.2 °C)-98.9 °F (37.2 °C)] 98.6 °F (37 °C)  Pulse:  [] 100  Resp:  [17-18] 18  SpO2:  [94 %-97 %] 97 %  BP: (118-144)/(64-89) 144/84     Weight: (!) 137 kg (302 lb 0.5 oz)  Body mass index is 55.24 kg/m².    Intake/Output Summary (Last 24 hours) at 11/10/2024 1316  Last data filed at 11/9/2024 2235  Gross per 24 hour   Intake --   Output 1 ml   Net -1 ml         Physical Exam  Constitutional:       General: She is not in acute distress.  Cardiovascular:      Rate and Rhythm: Normal rate and regular rhythm.   Pulmonary:      Effort: No respiratory distress.   Musculoskeletal:      Right lower leg: No edema.      Left lower leg: No edema.   Neurological:      Mental Status: She is alert.      Comments: + Bell Palsy             Significant Labs: All pertinent labs within the past 24 hours have been reviewed.    Significant Imaging: I have reviewed all pertinent imaging results/findings within the past 24 hours.    Assessment/Plan:      * Acute mastoiditis of right side with facial paralysis  Sepsis   Tympanic membrane rupture, bilateral - chronic   MRI temporal " bone revealed fluid throughout the right mastoid air cells and within the right middle ear suggesting otomastoiditis   - started on broad spectrum mastoiditis abx: vancomycin + metronidazole + ceftaz   - ENT consulted, appreciate recommendations.  They placed tube on 11/9 with exudate drained.  F/u cultures  - daily CBC and CMP , monitor vitals    Agoraphobia with panic disorder  Stable,   -- prn home benzo      Major depressive disorder, recurrent episode, moderate  Agoraphobia with panic disorder   Patient has recurrent depression which is moderate and is currently controlled.   - Will Continue anti-depressant medications. We will not consult psychiatry at this time.   - Patient does not display psychosis at this time. Continue to monitor closely and adjust plan of care as needed.  - continue wellbutrin, buspar and lexapro  - prn klonopin and trazadone for anxiety/insomnia     Class 3 severe obesity with body mass index (BMI) of 50.0 to 59.9 in adult  Body mass index is 55.24 kg/m². Morbid obesity complicates all aspects of disease management from diagnostic modalities to treatment. Weight loss encouraged and health benefits explained to patient.    History of Bell palsy  At admission endorses associated gradual onset of right facial numbness and paralysis. Did have Bell's palsy in the past on the left side but it was not associated with ear pain/infection  -- treat underlying ear infection and monitor symptoms   -- steroids x 2 weeks    Sepsis  2/4 SIRS on admission; WBC 16 and tachycardia  This patient does have evidence of infective focus  My overall impression is sepsis.  Source: Respiratory  Antibiotics given-   Antibiotics (72h ago, onward)      Start     Stop Route Frequency Ordered    11/09/24 1145  ciprofloxacin-dexAMETHasone 0.3-0.1% otic suspension 4 drop         -- RIGHT EAR 2 times daily 11/09/24 1042    11/09/24 0900  mupirocin 2 % ointment         11/14/24 0859 Nasl 2 times daily 11/08/24 2273     "11/09/24 0900  ceftAZIDime injection 2 g         -- IV Every 8 hours (non-standard times) 11/09/24 0847    11/09/24 0000  vancomycin 1,500 mg in D5W 250 mL IVPB (admixture device)         -- IV Every 8 hours (non-standard times) 11/09/24 1731    11/08/24 2345  metronidazole IVPB 500 mg         -- IV Every 8 hours (non-standard times) 11/08/24 2240    11/08/24 2338  vancomycin - pharmacy to dose  (vancomycin IVPB (PEDS and ADULTS))        Placed in "And" Linked Group    -- IV pharmacy to manage frequency 11/08/24 2240          Source control achieved by: abx and surgery      VTE Risk Mitigation (From admission, onward)           Ordered     Reason for No Pharmacological VTE Prophylaxis  Once        Question:  Reasons:  Answer:  Patient is Ambulatory    11/08/24 2240     IP VTE HIGH RISK PATIENT  Once         11/08/24 2240     Place sequential compression device  Until discontinued         11/08/24 2240                    Discharge Planning   KELLY: 11/12/2024     Code Status: Full Code   Is the patient medically ready for discharge?:     Reason for patient still in hospital (select all that apply): Patient trending condition                     Alexandro Kaufman MD  Department of Hospital Medicine   [facility] - Tele[facility] Stepdown    "

## 2024-11-10 NOTE — ASSESSMENT & PLAN NOTE
Liat Hernandez is a 23 y.o. female with Mastoiditis [H70.90] with associated facial nerve paralysis (house Brackmann V on the right, I on the left). Noted to have small defect in posterior superior aspect of  TM with scant purulence, cultured bedside. Now s/p OR for R P tube.     -- Continue ciprodex 4 gtt BID to R ear today   -- Continue steroid taper   -- Ok for po abx   -- Ok for discharge from ENT standpoint, will bring to clinic for exam under microscope later today   -- Recommend eye care due to facial nerve palsy   -- Please page ENT on call with any questions or concerns

## 2024-11-10 NOTE — SUBJECTIVE & OBJECTIVE
Interval History:   Doing well this morning. Feels pain is improving. Reporting draining fluid from R ear     Medications:  Continuous Infusions:  Scheduled Meds:   buPROPion  150 mg Oral QAM    busPIRone  7.5 mg Oral Daily    cefTAZidime IV (PEDS and ADULTS)  2 g Intravenous Q8H    ciprofloxacin-dexAMETHasone 0.3-0.1%  4 drop Right Ear BID    EScitalopram oxalate  10 mg Oral Daily    metroNIDAZOLE IV (PEDS and ADULTS)  500 mg Intravenous Q8H    mupirocin   Nasal BID    vancomycin (VANCOCIN) IV (PEDS and ADULTS)  1,500 mg Intravenous Q8H     PRN Meds:  Current Facility-Administered Medications:     acetaminophen, 650 mg, Oral, Q8H PRN    acetaminophen, 650 mg, Oral, Q4H PRN    albuterol-ipratropium, 3 mL, Nebulization, Q6H PRN    aluminum-magnesium hydroxide-simethicone, 30 mL, Oral, QID PRN    clonazePAM, 0.5 mg, Oral, BID PRN    dextrose 10%, 12.5 g, Intravenous, PRN    dextrose 10%, 12.5 g, Intravenous, PRN    dextrose 10%, 25 g, Intravenous, PRN    dextrose 10%, 25 g, Intravenous, PRN    glucagon (human recombinant), 1 mg, Intramuscular, PRN    glucose, 16 g, Oral, PRN    glucose, 24 g, Oral, PRN    melatonin, 6 mg, Oral, Nightly PRN    naloxone, 0.02 mg, Intravenous, PRN    ondansetron, 8 mg, Oral, Q8H PRN    sodium chloride 0.9%, 10 mL, Intravenous, Q12H PRN    traZODone, 50 mg, Oral, Nightly PRN    Pharmacy to dose Vancomycin consult, , , Once **AND** vancomycin - pharmacy to dose, , Intravenous, pharmacy to manage frequency     Review of patient's allergies indicates:   Allergen Reactions    Penicillins Itching     Pt. Reports throat itches when she takes it    Sulfa (sulfonamide antibiotics)      Objective:     Vital Signs (24h Range):  Temp:  [97.2 °F (36.2 °C)-98.9 °F (37.2 °C)] 98.6 °F (37 °C)  Pulse:  [] 100  Resp:  [11-23] 18  SpO2:  [92 %-99 %] 97 %  BP: (118-145)/(63-89) 144/84       Lines/Drains/Airways       Airway  Duration                  Airway - Non-Surgical 11/09/24 1137 LMA <1 day               Peripheral Intravenous Line  Duration                  Peripheral IV - Single Lumen 11/07/24 2305 20 G Left;Posterior Forearm 2 days                     Physical Exam  NAD  Resting comfortably in bed   R EAC obstructed with cerumen, removed with curette and suction  Small defect in posterior superior R TM with purulence, cultured   Remained of TM intact with JUSTIN but does not appear purulent   R HB V/VI with incomplete eye closure and asymmetry at rest   No postauricular/mastoid type fluctuance, erythema or induration      Significant Labs:  CBC:   Recent Labs   Lab 11/10/24  0232   WBC 13.53*   RBC 4.82   HGB 12.2   HCT 39.3      MCV 82   MCH 25.3*   MCHC 31.0*     CMP:   Recent Labs   Lab 11/09/24  0009 11/09/24  0330 11/10/24  0232   GLU 94   < > 93   CALCIUM 8.9   < > 9.0   ALBUMIN 3.1*  --   --    PROT 7.5  --   --       < > 138   K 3.8   < > 3.7   CO2 22*   < > 24      < > 103   BUN 9   < > 9   CREATININE 0.7   < > 0.8   ALKPHOS 73  --   --    ALT 16  --   --    AST 16  --   --    BILITOT 0.2  --   --     < > = values in this interval not displayed.       Significant Diagnostics:  I have reviewed and interpreted all pertinent imaging results/findings within the past 24 hours.

## 2024-11-10 NOTE — PROGRESS NOTES
[facility] - Telemetry Stepdown  Otorhinolaryngology-Head & Neck Surgery  Progress Note    Subjective:     Post-Op Info:  Procedure(s) (LRB):  MYRINGOTOMY, WITH TYMPANOSTOMY TUBE INSERTION (Right)   1 Day Post-Op  Hospital Day: 3     Interval History:   Doing well this morning. Feels pain is improving. Reporting draining fluid from R ear     Medications:  Continuous Infusions:  Scheduled Meds:   buPROPion  150 mg Oral QAM    busPIRone  7.5 mg Oral Daily    cefTAZidime IV (PEDS and ADULTS)  2 g Intravenous Q8H    ciprofloxacin-dexAMETHasone 0.3-0.1%  4 drop Right Ear BID    EScitalopram oxalate  10 mg Oral Daily    metroNIDAZOLE IV (PEDS and ADULTS)  500 mg Intravenous Q8H    mupirocin   Nasal BID    vancomycin (VANCOCIN) IV (PEDS and ADULTS)  1,500 mg Intravenous Q8H     PRN Meds:  Current Facility-Administered Medications:     acetaminophen, 650 mg, Oral, Q8H PRN    acetaminophen, 650 mg, Oral, Q4H PRN    albuterol-ipratropium, 3 mL, Nebulization, Q6H PRN    aluminum-magnesium hydroxide-simethicone, 30 mL, Oral, QID PRN    clonazePAM, 0.5 mg, Oral, BID PRN    dextrose 10%, 12.5 g, Intravenous, PRN    dextrose 10%, 12.5 g, Intravenous, PRN    dextrose 10%, 25 g, Intravenous, PRN    dextrose 10%, 25 g, Intravenous, PRN    glucagon (human recombinant), 1 mg, Intramuscular, PRN    glucose, 16 g, Oral, PRN    glucose, 24 g, Oral, PRN    melatonin, 6 mg, Oral, Nightly PRN    naloxone, 0.02 mg, Intravenous, PRN    ondansetron, 8 mg, Oral, Q8H PRN    sodium chloride 0.9%, 10 mL, Intravenous, Q12H PRN    traZODone, 50 mg, Oral, Nightly PRN    Pharmacy to dose Vancomycin consult, , , Once **AND** vancomycin - pharmacy to dose, , Intravenous, pharmacy to manage frequency     Review of patient's allergies indicates:   Allergen Reactions    Penicillins Itching     Pt. Reports throat itches when she takes it    Sulfa (sulfonamide antibiotics)      Objective:     Vital Signs (24h Range):  Temp:  [97.2 °F (36.2 °C)-98.9 °F (37.2  °C)] 98.6 °F (37 °C)  Pulse:  [] 100  Resp:  [11-23] 18  SpO2:  [92 %-99 %] 97 %  BP: (118-145)/(63-89) 144/84       Lines/Drains/Airways       Airway  Duration                  Airway - Non-Surgical 11/09/24 1137 LMA <1 day              Peripheral Intravenous Line  Duration                  Peripheral IV - Single Lumen 11/07/24 2305 20 G Left;Posterior Forearm 2 days                     Physical Exam  NAD  Resting comfortably in bed   R EAC obstructed with cerumen, removed with curette and suction  Small defect in posterior superior R TM with purulence, cultured   Remained of TM intact with JUSTIN but does not appear purulent   R HB V/VI with incomplete eye closure and asymmetry at rest   No postauricular/mastoid type fluctuance, erythema or induration      Significant Labs:  CBC:   Recent Labs   Lab 11/10/24  0232   WBC 13.53*   RBC 4.82   HGB 12.2   HCT 39.3      MCV 82   MCH 25.3*   MCHC 31.0*     CMP:   Recent Labs   Lab 11/09/24  0009 11/09/24  0330 11/10/24  0232   GLU 94   < > 93   CALCIUM 8.9   < > 9.0   ALBUMIN 3.1*  --   --    PROT 7.5  --   --       < > 138   K 3.8   < > 3.7   CO2 22*   < > 24      < > 103   BUN 9   < > 9   CREATININE 0.7   < > 0.8   ALKPHOS 73  --   --    ALT 16  --   --    AST 16  --   --    BILITOT 0.2  --   --     < > = values in this interval not displayed.       Significant Diagnostics:  I have reviewed and interpreted all pertinent imaging results/findings within the past 24 hours.  Assessment/Plan:     * Acute mastoiditis of right side with facial paralysis  Liat Hernandez is a 23 y.o. female with Mastoiditis [H70.90] with associated facial nerve paralysis (house Brackmann V on the right, I on the left). Noted to have small defect in posterior superior aspect of  TM with scant purulence, cultured bedside. Now s/p OR for R P tube.     -- Start ciprodex 4 gtt BID to R ear today   -- Recommend high dose po steroid tape over the next two weeks   -- Agree  with broad spectrum IV abx including rocephin   -- Pain/antinausea medications PRN   -- Remainder of care per primary team  -- Please page ENT on call with any questions or concerns           Carolin Osullivan MD  Otorhinolaryngology-Head & Neck Surgery  [facility] - Telemetry Stepdown

## 2024-11-10 NOTE — PROGRESS NOTES
[facility] - Telemetry Stepdown  Otorhinolaryngology-Head & Neck Surgery  Progress Note    Subjective:     Post-Op Info:  Procedure(s) (LRB):  MYRINGOTOMY, WITH TYMPANOSTOMY TUBE INSERTION (Right)   1 Day Post-Op  Hospital Day: 3     No new subjective & objective note has been filed under this hospital service since the last note was generated.    Assessment/Plan:     * Acute mastoiditis of right side with facial paralysis  Liat Hernandez is a 23 y.o. female with Mastoiditis [H70.90] with associated facial nerve paralysis (house Brackmann V on the right, I on the left). Noted to have small defect in posterior superior aspect of  TM with scant purulence, cultured bedside. Now s/p OR for R P tube.     -- Start ciprodex 4 gtt BID to R ear today   -- Recommend high dose po steroid tape over the next two weeks   -- Agree with broad spectrum IV abx including rocephin    --  Can transition to po   -- Will follow up OR cultures   -- Pain/antinausea medications PRN   -- Remainder of care per primary team  -- Please page ENT on call with any questions or concerns           Carolin Osullivan MD  Otorhinolaryngology-Head & Neck Surgery  [facility] - Telemetry Stepdown

## 2024-11-10 NOTE — ASSESSMENT & PLAN NOTE
"2/4 SIRS on admission; WBC 16 and tachycardia  This patient does have evidence of infective focus  My overall impression is sepsis.  Source: Respiratory  Antibiotics given-   Antibiotics (72h ago, onward)    Start     Stop Route Frequency Ordered    11/09/24 1145  ciprofloxacin-dexAMETHasone 0.3-0.1% otic suspension 4 drop         -- RIGHT EAR 2 times daily 11/09/24 1042    11/09/24 0900  mupirocin 2 % ointment         11/14/24 0859 Nasl 2 times daily 11/08/24 2258    11/09/24 0900  ceftAZIDime injection 2 g         -- IV Every 8 hours (non-standard times) 11/09/24 0847    11/09/24 0000  vancomycin 1,500 mg in D5W 250 mL IVPB (admixture device)         -- IV Every 8 hours (non-standard times) 11/09/24 1731    11/08/24 2345  metronidazole IVPB 500 mg         -- IV Every 8 hours (non-standard times) 11/08/24 2240    11/08/24 2338  vancomycin - pharmacy to dose  (vancomycin IVPB (PEDS and ADULTS))        Placed in "And" Linked Group    -- IV pharmacy to manage frequency 11/08/24 2240        Source control achieved by: abx and surgery  "

## 2024-11-11 ENCOUNTER — CLINICAL SUPPORT (OUTPATIENT)
Dept: AUDIOLOGY | Facility: CLINIC | Age: 24
End: 2024-11-11
Payer: MEDICAID

## 2024-11-11 VITALS
HEART RATE: 111 BPM | BODY MASS INDEX: 53.92 KG/M2 | SYSTOLIC BLOOD PRESSURE: 154 MMHG | OXYGEN SATURATION: 67 % | TEMPERATURE: 98 F | DIASTOLIC BLOOD PRESSURE: 92 MMHG | HEIGHT: 62 IN | WEIGHT: 293 LBS | RESPIRATION RATE: 19 BRPM

## 2024-11-11 DIAGNOSIS — H72.93: Primary | ICD-10-CM

## 2024-11-11 DIAGNOSIS — G51.0 FACIAL NERVE PARALYSIS: ICD-10-CM

## 2024-11-11 DIAGNOSIS — H90.11 CONDUCTIVE HEARING LOSS OF RIGHT EAR WITH UNRESTRICTED HEARING OF LEFT EAR: Primary | ICD-10-CM

## 2024-11-11 PROBLEM — E87.6 HYPOKALEMIA: Status: ACTIVE | Noted: 2024-11-11

## 2024-11-11 LAB
ACID FAST MOD KINY STN SPEC: NORMAL
ANION GAP SERPL CALC-SCNC: 7 MMOL/L (ref 8–16)
BACTERIA SPEC AEROBE CULT: NORMAL
BASOPHILS # BLD AUTO: 0.04 K/UL (ref 0–0.2)
BASOPHILS NFR BLD: 0.2 % (ref 0–1.9)
BUN SERPL-MCNC: 8 MG/DL (ref 6–20)
CALCIUM SERPL-MCNC: 8.1 MG/DL (ref 8.7–10.5)
CHLORIDE SERPL-SCNC: 107 MMOL/L (ref 95–110)
CO2 SERPL-SCNC: 26 MMOL/L (ref 23–29)
CREAT SERPL-MCNC: 0.6 MG/DL (ref 0.5–1.4)
DIFFERENTIAL METHOD BLD: ABNORMAL
EOSINOPHIL # BLD AUTO: 0.1 K/UL (ref 0–0.5)
EOSINOPHIL NFR BLD: 0.5 % (ref 0–8)
ERYTHROCYTE [DISTWIDTH] IN BLOOD BY AUTOMATED COUNT: 14.4 % (ref 11.5–14.5)
EST. GFR  (NO RACE VARIABLE): >60 ML/MIN/1.73 M^2
GLUCOSE SERPL-MCNC: 84 MG/DL (ref 70–110)
HCT VFR BLD AUTO: 36.7 % (ref 37–48.5)
HGB BLD-MCNC: 11.5 G/DL (ref 12–16)
IMM GRANULOCYTES # BLD AUTO: 0.08 K/UL (ref 0–0.04)
IMM GRANULOCYTES NFR BLD AUTO: 0.5 % (ref 0–0.5)
LYMPHOCYTES # BLD AUTO: 3.2 K/UL (ref 1–4.8)
LYMPHOCYTES NFR BLD: 19.7 % (ref 18–48)
MAGNESIUM SERPL-MCNC: 1.8 MG/DL (ref 1.6–2.6)
MCH RBC QN AUTO: 25.4 PG (ref 27–31)
MCHC RBC AUTO-ENTMCNC: 31.3 G/DL (ref 32–36)
MCV RBC AUTO: 81 FL (ref 82–98)
MONOCYTES # BLD AUTO: 1.5 K/UL (ref 0.3–1)
MONOCYTES NFR BLD: 9 % (ref 4–15)
MYCOBACTERIUM SPEC QL CULT: NORMAL
NEUTROPHILS # BLD AUTO: 11.5 K/UL (ref 1.8–7.7)
NEUTROPHILS NFR BLD: 70.1 % (ref 38–73)
NRBC BLD-RTO: 0 /100 WBC
PHOSPHATE SERPL-MCNC: 2.9 MG/DL (ref 2.7–4.5)
PLATELET # BLD AUTO: 398 K/UL (ref 150–450)
PMV BLD AUTO: 10.2 FL (ref 9.2–12.9)
POTASSIUM SERPL-SCNC: 3 MMOL/L (ref 3.5–5.1)
RBC # BLD AUTO: 4.53 M/UL (ref 4–5.4)
SODIUM SERPL-SCNC: 140 MMOL/L (ref 136–145)
VANCOMYCIN TROUGH SERPL-MCNC: 18.8 UG/ML (ref 10–22)
WBC # BLD AUTO: 16.4 K/UL (ref 3.9–12.7)

## 2024-11-11 PROCEDURE — 84100 ASSAY OF PHOSPHORUS: CPT

## 2024-11-11 PROCEDURE — 80048 BASIC METABOLIC PNL TOTAL CA: CPT

## 2024-11-11 PROCEDURE — 80202 ASSAY OF VANCOMYCIN: CPT | Performed by: HOSPITALIST

## 2024-11-11 PROCEDURE — 25000003 PHARM REV CODE 250

## 2024-11-11 PROCEDURE — C1751 CATH, INF, PER/CENT/MIDLINE: HCPCS

## 2024-11-11 PROCEDURE — 92557 COMPREHENSIVE HEARING TEST: CPT | Mod: PBBFAC

## 2024-11-11 PROCEDURE — 85025 COMPLETE CBC W/AUTO DIFF WBC: CPT

## 2024-11-11 PROCEDURE — 25000003 PHARM REV CODE 250: Performed by: HOSPITALIST

## 2024-11-11 PROCEDURE — 92567 TYMPANOMETRY: CPT | Mod: PBBFAC

## 2024-11-11 PROCEDURE — 83735 ASSAY OF MAGNESIUM: CPT

## 2024-11-11 PROCEDURE — 36415 COLL VENOUS BLD VENIPUNCTURE: CPT | Performed by: HOSPITALIST

## 2024-11-11 PROCEDURE — 36410 VNPNXR 3YR/> PHY/QHP DX/THER: CPT

## 2024-11-11 PROCEDURE — 63600175 PHARM REV CODE 636 W HCPCS: Mod: JZ,JG

## 2024-11-11 PROCEDURE — 63600175 PHARM REV CODE 636 W HCPCS: Performed by: HOSPITALIST

## 2024-11-11 RX ORDER — CIPROFLOXACIN AND DEXAMETHASONE 3; 1 MG/ML; MG/ML
4 SUSPENSION/ DROPS AURICULAR (OTIC) 2 TIMES DAILY
Qty: 7.5 ML | Refills: 0 | Status: ON HOLD | OUTPATIENT
Start: 2024-11-11 | End: 2024-11-13 | Stop reason: HOSPADM

## 2024-11-11 RX ORDER — ACETAMINOPHEN 500 MG
1000 TABLET ORAL EVERY 6 HOURS PRN
COMMUNITY
Start: 2024-11-11

## 2024-11-11 RX ORDER — PREDNISONE 20 MG/1
TABLET ORAL
Qty: 21 TABLET | Refills: 0 | Status: SHIPPED | OUTPATIENT
Start: 2024-11-11 | End: 2024-11-22

## 2024-11-11 RX ORDER — POTASSIUM CHLORIDE 20 MEQ/1
40 TABLET, EXTENDED RELEASE ORAL
Status: DISPENSED | OUTPATIENT
Start: 2024-11-11 | End: 2024-11-11

## 2024-11-11 RX ORDER — CEFDINIR 300 MG/1
300 CAPSULE ORAL 2 TIMES DAILY
Qty: 16 CAPSULE | Refills: 0 | Status: SHIPPED | OUTPATIENT
Start: 2024-11-11 | End: 2024-11-19

## 2024-11-11 RX ORDER — SODIUM CHLORIDE 0.9 % (FLUSH) 0.9 %
10 SYRINGE (ML) INJECTION EVERY 12 HOURS PRN
Status: DISCONTINUED | OUTPATIENT
Start: 2024-11-11 | End: 2024-11-11 | Stop reason: HOSPADM

## 2024-11-11 RX ADMIN — VANCOMYCIN HYDROCHLORIDE 1250 MG: 1.25 INJECTION, POWDER, LYOPHILIZED, FOR SOLUTION INTRAVENOUS at 10:11

## 2024-11-11 RX ADMIN — ESCITALOPRAM 10 MG: 5 TABLET, FILM COATED ORAL at 09:11

## 2024-11-11 RX ADMIN — METRONIDAZOLE 500 MG: 5 INJECTION, SOLUTION INTRAVENOUS at 09:11

## 2024-11-11 RX ADMIN — BUPROPION HYDROCHLORIDE 150 MG: 150 TABLET, EXTENDED RELEASE ORAL at 06:11

## 2024-11-11 RX ADMIN — MUPIROCIN: 20 OINTMENT TOPICAL at 09:11

## 2024-11-11 RX ADMIN — POTASSIUM CHLORIDE 40 MEQ: 1500 TABLET, EXTENDED RELEASE ORAL at 09:11

## 2024-11-11 RX ADMIN — CEFTAZIDIME 2 G: 2 INJECTION, POWDER, FOR SOLUTION INTRAVENOUS at 12:11

## 2024-11-11 RX ADMIN — CIPROFLOXACIN AND DEXAMETHASONE 4 DROP: 3; 1 SUSPENSION/ DROPS AURICULAR (OTIC) at 09:11

## 2024-11-11 RX ADMIN — CEFTAZIDIME 2 G: 2 INJECTION, POWDER, FOR SOLUTION INTRAVENOUS at 10:11

## 2024-11-11 RX ADMIN — PREDNISONE 60 MG: 50 TABLET ORAL at 09:11

## 2024-11-11 RX ADMIN — VANCOMYCIN HYDROCHLORIDE 1500 MG: 1.5 INJECTION, POWDER, LYOPHILIZED, FOR SOLUTION INTRAVENOUS at 01:11

## 2024-11-11 RX ADMIN — BUSPIRONE HYDROCHLORIDE 7.5 MG: 5 TABLET ORAL at 10:11

## 2024-11-11 NOTE — PROGRESS NOTES
[facility] - Telemetry Stepdown  Otorhinolaryngology-Head & Neck Surgery  Progress Note    Subjective:     Post-Op Info:  Procedure(s) (LRB):  MYRINGOTOMY, WITH TYMPANOSTOMY TUBE INSERTION (Right)   2 Days Post-Op  Hospital Day: 4     Interval History:   Doing well this am. Has not been doing any eye care on the right eye. Pain still improved compared to prior to tube placement.     Medications:  Continuous Infusions:  Scheduled Meds:   buPROPion  150 mg Oral QAM    busPIRone  7.5 mg Oral Daily    cefTAZidime IV (PEDS and ADULTS)  2 g Intravenous Q8H    ciprofloxacin-dexAMETHasone 0.3-0.1%  4 drop Right Ear BID    EScitalopram oxalate  10 mg Oral Daily    metroNIDAZOLE IV (PEDS and ADULTS)  500 mg Intravenous Q8H    mupirocin   Nasal BID    potassium chloride SA  40 mEq Oral Q2H    predniSONE  60 mg Oral Daily    Followed by    [START ON 11/15/2024] predniSONE  40 mg Oral Daily    Followed by    [START ON 11/20/2024] predniSONE  20 mg Oral Daily    vancomycin (VANCOCIN) IV (PEDS and ADULTS)  1,250 mg Intravenous Q8H     PRN Meds:  Current Facility-Administered Medications:     acetaminophen, 650 mg, Oral, Q8H PRN    acetaminophen, 650 mg, Oral, Q4H PRN    albuterol-ipratropium, 3 mL, Nebulization, Q6H PRN    aluminum-magnesium hydroxide-simethicone, 30 mL, Oral, QID PRN    clonazePAM, 0.5 mg, Oral, BID PRN    dextrose 10%, 12.5 g, Intravenous, PRN    dextrose 10%, 12.5 g, Intravenous, PRN    dextrose 10%, 25 g, Intravenous, PRN    dextrose 10%, 25 g, Intravenous, PRN    glucagon (human recombinant), 1 mg, Intramuscular, PRN    glucose, 16 g, Oral, PRN    glucose, 24 g, Oral, PRN    melatonin, 6 mg, Oral, Nightly PRN    naloxone, 0.02 mg, Intravenous, PRN    ondansetron, 8 mg, Oral, Q8H PRN    sodium chloride 0.9%, 10 mL, Intravenous, Q12H PRN    Flushing PICC/Midline Protocol, , , Until Discontinued **AND** sodium chloride 0.9%, 10 mL, Intravenous, Q12H PRN    traZODone, 50 mg, Oral, Nightly PRN    Pharmacy to dose  Vancomycin consult, , , Once **AND** vancomycin - pharmacy to dose, , Intravenous, pharmacy to manage frequency     Review of patient's allergies indicates:   Allergen Reactions    Penicillins Itching     Pt. Reports throat itches when she takes it    Sulfa (sulfonamide antibiotics)      Objective:     Vital Signs (24h Range):  Temp:  [98 °F (36.7 °C)-98.6 °F (37 °C)] 98.2 °F (36.8 °C)  Pulse:  [] 111  Resp:  [18-20] 19  SpO2:  [67 %-98 %] 67 %  BP: (142-165)/(83-97) 154/92       Lines/Drains/Airways       Airway  Duration                  Airway - Non-Surgical 11/09/24 1137 LMA 2 days              Peripheral Intravenous Line  Duration                  Peripheral IV - Single Lumen 11/07/24 2305 20 G Left;Posterior Forearm 3 days         Midline Catheter - Single Lumen 11/11/24 0335 Left basilic vein (medial side of arm) other (see comments) <1 day                     Physical Exam  NAD, resting in bed   HB VI/VI on the R, I/VI on the L   R TM with tube in place anteriorly, crusting in the attic   No significant mastoid fluctuance or erythema, mild TTP   Bilateral auricle normal      Significant Labs:  CBC:   Recent Labs   Lab 11/11/24  0610   WBC 16.40*   RBC 4.53   HGB 11.5*   HCT 36.7*      MCV 81*   MCH 25.4*   MCHC 31.3*       Significant Diagnostics:  I have reviewed and interpreted all pertinent imaging results/findings within the past 24 hours.  Assessment/Plan:     * Acute mastoiditis of right side with facial paralysis  Liat Hernandez is a 23 y.o. female with Mastoiditis [H70.90] with associated facial nerve paralysis (house Brackmann V on the right, I on the left). Noted to have small defect in posterior superior aspect of  TM with scant purulence, cultured bedside. Now s/p OR for R P tube.     -- Continue ciprodex 4 gtt BID to R ear today   -- Continue steroid taper   -- Ok for po abx   -- Ok for discharge from ENT standpoint, will bring to clinic for exam under microscope later today    -- Recommend eye care due to facial nerve palsy   -- Please page ENT on call with any questions or concerns           Carolin Osullivan MD  Otorhinolaryngology-Head & Neck Surgery  [facility] - Telemetry Stepdown

## 2024-11-11 NOTE — DISCHARGE SUMMARY
"[facility] - Telemetry [facility]  Discharge Summary      Patient Name: Liat Hernandez  MRN: [id number]  ETHAN: [id number]  Patient Class: IP- Inpatient  Admission Date: 11/8/2024  Hospital Length of Stay: 3 days  Discharge Date and Time: 11/11/2024  2:26 PM  Attending Physician: No att. providers found   Discharging Provider: Alexandro Kaufman MD  Primary Care Provider: [facility] Medicine Team: [facility] HOSP MED S Alexandro Kaufman MD  Primary Care Team: [facility]    HPI:   Liat Hernandez is a 23 y.o. female with chronic TM rupture, hx of Bell's palsy, MDD, panic disorder and morbid obesity being admitted to hospital medicine as a transfer from [facility] for ENT evaluation of right mastoiditis with facial paralysis. Patient reports posterior right ear pain and inner ear pain that started approximately 3 days ago. Endoreses associated gradual onset of right facial numbness and paralysis. She was evaluated at [facility] for this issue prior to being transferred to [facility]. Denies vision changes, fever, chills, abdominal pain, nausea, vomiting, diarrhea, chest pain or shortness of breath. Denies familiarity of symptoms - did have Bell's palsy in the past on the left side but it was not associated with ear pain/infection. Denies alcohol, tobacco or drug use.         Per  transfer note "Patient is a 23-year-old female with a history of Bell's palsy and chronic tympanic membrane ruptures that presents to the emergency department because of right-sided facial pain with associated neurological symptoms.  Per notes, 2 days prior to presentation, patient began to experience posterior right ear pain.  This now progressed into right upper and lower facial droop and tongue paresthesias.  Patient hypertensive and tachycardic on presentation.  White count 16.  H&H 12/38.  MCV 78.  Platelet 382.  CMP shows no significant electrolyte abnormalities.  Negative pregnancy test.  " "Negative COVID and flu test.  Blood cultures pending.  CT scan of the head and temporal bones with contrast shows evidence of osto-mastoiditis.  Patient given vancomycin, Rocephin.  I agreed to accept patient for transfer for ENT consultation/higher level of care"    Procedure(s) (LRB):  MYRINGOTOMY, WITH TYMPANOSTOMY TUBE INSERTION (Right)      Hospital Course:   See problem list     Goals of Care Treatment Preferences:  Code Status: Full Code      SDOH Screening:  The patient was screened for utility difficulties, food insecurity, transport difficulties, housing insecurity, and interpersonal safety and there were no concerns identified this admission.     Consults:   Consults (From admission, onward)          Status Ordering Provider     Inpatient consult to Midline team  Once        Provider:  (Not yet assigned)    Completed MAURILIO MARTINEZ     Inpatient consult to ENT  Once        Provider:  (Not yet assigned)    Completed PIETER MARTÍNEZ     Pharmacy to dose Vancomycin consult  Once        Provider:  (Not yet assigned)   Placed in "And" Linked Group    Acknowledged PIETER MARTÍNEZ            Neuro  History of Bell palsy  At admission endorses associated gradual onset of right facial numbness and paralysis. Did have Bell's palsy in the past on the left side but it was not associated with ear pain/infection  -- treat underlying ear infection and monitor symptoms   -- steroids x 2 weeks    ENT  * Acute mastoiditis of right side with facial paralysis  Sepsis   Tympanic membrane rupture, bilateral - chronic   MRI temporal bone revealed fluid throughout the right mastoid air cells and within the right middle ear suggesting otomastoiditis   - started on broad spectrum mastoiditis abx: vancomycin + metronidazole + ceftaz   - ENT consulted, appreciate recommendations.  They placed tube on 11/9 with exudate drained.  F/u cultures had nl respiratory kenyatta and NG at DC.    Renal/  Hypokalemia  Patient's most " recent potassium results are listed below.   Recent Labs     11/09/24  0330 11/10/24  0232 11/11/24  0610   K 3.8 3.7 3.0*     Plan  - Replete potassium per protocol  - Monitor potassium Daily  - Patient's hypokalemia is improving        Final Active Diagnoses:    Diagnosis Date Noted POA    PRINCIPAL PROBLEM:  Acute mastoiditis of right side with facial paralysis [H70.091, G51.0] 11/08/2024 Yes    Hypokalemia [E87.6] 11/11/2024 Yes    Major depressive disorder, recurrent episode, moderate [F33.1] 11/09/2024 Yes    Agoraphobia with panic disorder [F40.01] 11/09/2024 Yes    Sepsis [A41.9] 11/08/2024 Yes    Class 3 severe obesity with body mass index (BMI) of 50.0 to 59.9 in adult [E66.813, Z68.43, E66.01] 11/08/2024 Not Applicable    History of Bell palsy [Z86.69] 11/08/2024 Not Applicable      Problems Resolved During this Admission:       Discharged Condition: good    Disposition: Home or Self Care    Follow Up:   Follow-up Information       [facility]. Schedule an appointment as soon as possible for a visit in 2 week(s).    Specialty: Otolaryngology  Contact information:  [address] Cle[address] janee  [facility] [address]  [phone]             [facility], Start Follow up on 11/14/2024.    Specialty: Internal Medicine  Why: Established [facility] f/u visit @ 12:30 pm. Please bring discharge summary, ID, insurance card, and medication list.  Contact information:  [address] [place] [address]  Mary [place] [address] [phone]                           Patient Instructions:      Ambulatory referral/consult to ENT   Standing Status: Future   Referral Priority: Routine Referral Type: Consultation   Referral Reason: Specialty Services Required   Requested Specialty: Otolaryngology   Number of Visits Requested: 1     Diet Adult Regular     Notify your health care provider if you experience any of the following:  temperature >100.4     Notify your health care provider if you experience any of the following:  severe uncontrolled pain      Notify your health care provider if you experience any of the following:  persistent dizziness, light-headedness, or visual disturbances     Notify your health care provider if you experience any of the following:  worsening rash     Notify your health care provider if you experience any of the following:  severe persistent headache     Notify your health care provider if you experience any of the following:  increased confusion or weakness     Activity as tolerated       Significant Diagnostic Studies: N/A    Pending Diagnostic Studies:       None           Medications:  Reconciled Home Medications:      Medication List        START taking these medications      acetaminophen 500 MG tablet  Commonly known as: TYLENOL  Take 2 tablets (1,000 mg total) by mouth every 6 (six) hours as needed for Pain.     cefdinir 300 MG capsule  Commonly known as: OMNICEF  Take 1 capsule (300 mg total) by mouth 2 (two) times daily. for 8 days     ciprofloxacin-dexAMETHasone 0.3-0.1% 0.3-0.1 % Drps  Commonly known as: CIPRODEX  Place 4 drops into the right ear 2 (two) times daily. for 7 days     predniSONE 20 MG tablet  Commonly known as: DELTASONE  Take 3 tablets (60 mg total) by mouth once daily for 3 days, THEN 2 tablets (40 mg total) once daily for 4 days, THEN 1 tablet (20 mg total) once daily for 4 days.  Start taking on: November 11, 2024            CONTINUE taking these medications      buPROPion 150 MG TB24 tablet  Commonly known as: WELLBUTRIN XL  Take 150 mg by mouth every morning.     busPIRone 7.5 MG tablet  Commonly known as: BUSPAR  Take 7.5 mg by mouth once daily.     clonazePAM 0.5 MG tablet  Commonly known as: KlonoPIN  Take 0.5 mg by mouth 2 (two) times daily as needed for Anxiety.     EScitalopram oxalate 10 MG tablet  Commonly known as: LEXAPRO  Take 10 mg by mouth once daily.     traZODone 50 MG tablet  Commonly known as: DESYREL  nightly as needed.              Indwelling Lines/Drains at time of discharge:    Lines/Drains/Airways       Airway  Duration                  Airway - Non-Surgical 11/09/24 1137 LMA 2 days                    Time spent on the discharge of patient: 33 minutes         Alexandro Kaufman MD  Department of Hospital Medicine  [facility] - Telemetry Stepdown

## 2024-11-11 NOTE — SUBJECTIVE & OBJECTIVE
Interval History:   Doing well this am. Has not been doing any eye care on the right eye. Pain still improved compared to prior to tube placement.     Medications:  Continuous Infusions:  Scheduled Meds:   buPROPion  150 mg Oral QAM    busPIRone  7.5 mg Oral Daily    cefTAZidime IV (PEDS and ADULTS)  2 g Intravenous Q8H    ciprofloxacin-dexAMETHasone 0.3-0.1%  4 drop Right Ear BID    EScitalopram oxalate  10 mg Oral Daily    metroNIDAZOLE IV (PEDS and ADULTS)  500 mg Intravenous Q8H    mupirocin   Nasal BID    potassium chloride SA  40 mEq Oral Q2H    predniSONE  60 mg Oral Daily    Followed by    [START ON 11/15/2024] predniSONE  40 mg Oral Daily    Followed by    [START ON 11/20/2024] predniSONE  20 mg Oral Daily    vancomycin (VANCOCIN) IV (PEDS and ADULTS)  1,250 mg Intravenous Q8H     PRN Meds:  Current Facility-Administered Medications:     acetaminophen, 650 mg, Oral, Q8H PRN    acetaminophen, 650 mg, Oral, Q4H PRN    albuterol-ipratropium, 3 mL, Nebulization, Q6H PRN    aluminum-magnesium hydroxide-simethicone, 30 mL, Oral, QID PRN    clonazePAM, 0.5 mg, Oral, BID PRN    dextrose 10%, 12.5 g, Intravenous, PRN    dextrose 10%, 12.5 g, Intravenous, PRN    dextrose 10%, 25 g, Intravenous, PRN    dextrose 10%, 25 g, Intravenous, PRN    glucagon (human recombinant), 1 mg, Intramuscular, PRN    glucose, 16 g, Oral, PRN    glucose, 24 g, Oral, PRN    melatonin, 6 mg, Oral, Nightly PRN    naloxone, 0.02 mg, Intravenous, PRN    ondansetron, 8 mg, Oral, Q8H PRN    sodium chloride 0.9%, 10 mL, Intravenous, Q12H PRN    Flushing PICC/Midline Protocol, , , Until Discontinued **AND** sodium chloride 0.9%, 10 mL, Intravenous, Q12H PRN    traZODone, 50 mg, Oral, Nightly PRN    Pharmacy to dose Vancomycin consult, , , Once **AND** vancomycin - pharmacy to dose, , Intravenous, pharmacy to manage frequency     Review of patient's allergies indicates:   Allergen Reactions    Penicillins Itching     Pt. Reports throat itches when  she takes it    Sulfa (sulfonamide antibiotics)      Objective:     Vital Signs (24h Range):  Temp:  [98 °F (36.7 °C)-98.6 °F (37 °C)] 98.2 °F (36.8 °C)  Pulse:  [] 111  Resp:  [18-20] 19  SpO2:  [67 %-98 %] 67 %  BP: (142-165)/(83-97) 154/92       Lines/Drains/Airways       Airway  Duration                  Airway - Non-Surgical 11/09/24 1137 LMA 2 days              Peripheral Intravenous Line  Duration                  Peripheral IV - Single Lumen 11/07/24 2305 20 G Left;Posterior Forearm 3 days         Midline Catheter - Single Lumen 11/11/24 0335 Left basilic vein (medial side of arm) other (see comments) <1 day                     Physical Exam  NAD, resting in bed   HB VI/VI on the R, I/VI on the L   R TM with tube in place anteriorly, crusting in the attic   No significant mastoid fluctuance or erythema, mild TTP   Bilateral auricle normal      Significant Labs:  CBC:   Recent Labs   Lab 11/11/24  0610   WBC 16.40*   RBC 4.53   HGB 11.5*   HCT 36.7*      MCV 81*   MCH 25.4*   MCHC 31.3*       Significant Diagnostics:  I have reviewed and interpreted all pertinent imaging results/findings within the past 24 hours.

## 2024-11-11 NOTE — ASSESSMENT & PLAN NOTE
Sepsis   Tympanic membrane rupture, bilateral - chronic   MRI temporal bone revealed fluid throughout the right mastoid air cells and within the right middle ear suggesting otomastoiditis   - started on broad spectrum mastoiditis abx: vancomycin + metronidazole + ceftaz   - ENT consulted, appreciate recommendations.  They placed tube on 11/9 with exudate drained.  F/u cultures had nl respiratory kenyatta and NG at DC.

## 2024-11-11 NOTE — PROGRESS NOTES
Liat Hernandez was seen today in the clinic for a pre-op audiologic evaluation. Ms. Hernandez reported no perceived difficulty hearing. She reported a history of right sided Bell's Palsy and ear surgery. Mrs. Hernandez reported occasional, non-localizing tinnitus. She recently received a pressure equalization (PE) tube in the right ear. She denied otalgia and aural fullness. She reported a history of occupational and recreational noise exposure, without consistent use of hearing protection when in noise.    Tympanometry revealed Type B with an average ear canal volume in the right ear and Type A in the left ear.     Audiogram results revealed a mild conductive hearing loss (CHL) in the right ear and normal hearing sensitivity in the left ear.      Speech reception thresholds were noted at 25 dBHL in the right ear and 5 dBHL in the left ear.    Speech discrimination scores were 100% in the right ear and 100% in the left ear.    Recommendations:  Otologic evaluation  Repeat audiogram in conjunction with ENT plan of care  Hearing protection in noise

## 2024-11-11 NOTE — PLAN OF CARE
Patient remains free from falls and injury. NADN. VSS. Safety maintained; bed low and locked, call light in reach.  No complaint of pain, n/v, diarrhea, or SOB. Questions encouraged and answered. Plan of care reviewed with patient. Will continue to monitor, will continue with plan of care.

## 2024-11-11 NOTE — ANESTHESIA POSTPROCEDURE EVALUATION
Anesthesia Post Evaluation    Patient: Liat Hernandez    Procedure(s) Performed: Procedure(s) (LRB):  MYRINGOTOMY, WITH TYMPANOSTOMY TUBE INSERTION (Right)    Final Anesthesia Type: general      Patient location during evaluation: PACU  Patient participation: Yes- Able to Participate  Level of consciousness: awake and alert  Post-procedure vital signs: reviewed and stable  Pain management: adequate  Airway patency: patent    PONV status at discharge: No PONV  Anesthetic complications: no      Cardiovascular status: blood pressure returned to baseline  Respiratory status: unassisted  Hydration status: euvolemic  Follow-up not needed.              Vitals Value Taken Time   /84 11/10/24 1619   Temp 36.7 °C (98 °F) 11/10/24 1615   Pulse 98 11/10/24 1616   Resp 16 11/10/24 0735   SpO2 96 % 11/10/24 1616   Vitals shown include unfiled device data.      No case tracking events are documented in the log.      Pain/Allegra Score: Pain Rating Prior to Med Admin: 8 (11/9/2024 12:33 PM)  Allegra Score: 10 (11/9/2024  1:00 PM)

## 2024-11-11 NOTE — PROGRESS NOTES
Pharmacokinetic Assessment Follow Up: IV Vancomycin    Vancomycin serum concentration assessment(s):    The trough level was drawn correctly and can be used to guide therapy at this time. The measurement is within the desired definitive target range of 10 to 20 mcg/mL.  - The trough level was drawn ~7 hours after previous dose and resulted at 18.8.    Vancomycin Regimen Plan:    Change regimen to Vancomycin 1250 mg IV every 8 hours with next serum trough concentration measured at 0800 prior to 4th dose on 11/12.  - Ms. Hernandez's renal function appears stable and at baseline.  - Although her level was within goal, her level increased significantly between her Q12H regimen and Q8H regimen levels. I am concerned that she will continue to accumulate on the Q8H regimen given her BMI, so I empirically reduced the dose to account for this.   - Please draw random level sooner than scheduled trough if renal function changes significantly.    Drug levels (last 3 results):  Recent Labs   Lab Result Units 11/09/24  1547 11/11/24  0103   Vancomycin-Trough ug/mL 8.7* 18.8       Pharmacy will continue to follow and monitor vancomycin.    Please contact pharmacy at extension [phone] for questions regarding this assessment.    Thank you for the consult,   Bonnie Baez       Patient brief summary:  Liat Hernandez is a 23 y.o. female initiated on antimicrobial therapy with IV Vancomycin for treatment of  mastoiditis    The patient's previous regimen was 1500 mg Q8H    Actual Body Weight:   137 kg    Renal Function:   Estimated Creatinine Clearance: 147.8 mL/min (based on SCr of 0.8 mg/dL).     Dialysis Method (if applicable):  N/A

## 2024-11-11 NOTE — NURSING
Report received from EDGARDO Marrero. Patient remains free from falls and injury. NADN. VSS. Questions encouraged and answered. Patient verbalized understanding. Bed locked and in low position; safety maintained. Call light in reach. White board updated, and explained. No complaint of pain, n/v, diarrhea, or SOB.  Will continue to monitor, will continue with plan of care.

## 2024-11-11 NOTE — ASSESSMENT & PLAN NOTE
Patient's most recent potassium results are listed below.   Recent Labs     11/09/24  0330 11/10/24  0232 11/11/24  0610   K 3.8 3.7 3.0*     Plan  - Replete potassium per protocol  - Monitor potassium Daily  - Patient's hypokalemia is improving

## 2024-11-11 NOTE — PLAN OF CARE
KYEW scheduled 's hospital f/u visit on 11/14/24 @ 12:30 pm.   Oncology brief note.      Discussed diagnosis of metastatic squamous cell carcinoma with patient and that the primary source is unclear.  Per discussion with pathology, most likely sources are from lung or possible  tract based on IHC staining of tumor markers.  Discussed that we are unable to provide prognosis or treatment plan at this time while evaluation for primary source is ongoing as treatment and prognosis varies widely based on primary.  We did discuss that this is considered to be stage 4 cancer due to mets.  We discussed that pulmonary and gyn onc evaluation would be requested due to concern for primary source from corresponding anatomic sites.      Recommend discussion with pulmonary/gyn-onc as is ongoing.    Consider repeat imaging of RUL consolidation for better evaluation of possible pulmonary source.      Plan discussed with Dr. Myers.     Lucian Bales MD, PhD  Hematology/Oncology Fellow  Pager: [phone]

## 2024-11-12 ENCOUNTER — TELEPHONE (OUTPATIENT)
Dept: OTOLARYNGOLOGY | Facility: CLINIC | Age: 24
End: 2024-11-12
Payer: MEDICAID

## 2024-11-12 NOTE — PLAN OF CARE
[facility] - Telemetry Stepdown  Discharge Final Note    Primary Care Provider: Jax, Start    Expected Discharge Date: 11/11/2024    Patient discharged 11/11/2024 home with no needs.    Future Appointments   Date Time Provider Department Center   11/19/2024 11:00 AM Joshua Wheeler MD [facility] [address]       Start Jax   Specialty: Internal Medicine   Relationship: PCP - General    [address] [place] [address]  [place] [address]   Phone: [phone]      Next Steps: Follow up on 11/14/2024   Instructions: Established pt's hospital f/u visit @ 12:30 pm. Please bring discharge summary, ID, insurance card, and medication list.            Final Discharge Note (most recent)       Final Note - 11/12/24 0820          Final Note    Assessment Type Final Discharge Note     Anticipated Discharge Disposition Home or Self Care     Hospital Resources/Appts/Education Provided Post-Acute resouces added to AVS        Post-Acute Status    Discharge Delays None known at this time                     Important Message from Medicare             Contact Info       [facility]   Specialty: Otolaryngology    [address]  [place] [address]   Phone: [phone]       Next Steps: Schedule an appointment as soon as possible for a visit in 2 week(s)    Start Jax   Specialty: Internal Medicine   Relationship: PCP - General    [address] [place] [address]  [place] [address]   Phone: [phone]       Next Steps: Follow up on 11/14/2024    Instructions: Established pt's hospital f/u visit @ 12:30 pm. Please bring discharge summary, ID, insurance card, and medication list.          Emelina Gomez RN     [phone]

## 2024-11-12 NOTE — PRE-PROCEDURE INSTRUCTIONS
PreOp Instructions given:    -- Medication information (what to hold and what to take)   -- NPO guidelines as follows: (or as per your Surgeon)  Stop ALL solid food, gum, candy 8 hours before arrival time.  Stop all CLOUDY liquids: coffee with creamer, cloudy juices, 8 hours prior to arrival time.  The patient should be ENCOURAGED to drink carbohydrate-rich clear liquids (sports drinks, clear juices) until 2 hours prior to arrival time.  Stop clear liquids 2 hours prior to arrival time.  CLEAR liquids include water, black coffee NO creamer, clear oral rehydration drinks, clear sports drinks and clear fruit juices (no orange juice, no pulpy juices, no apple cider).   IF IN DOUBT, drink water instead.   NOTHING TO DRINK 2 hours before to surgery/procedure  time. If you are told to take medication on the morning of surgery, it may be taken with a sip of water.   -- *Arrival place and directions given*.  Time to be given the day before procedure by the Surgeon's Office   -- Bathe with antibacterial soap (dial or Hibiclens as instructed)  -- Don't wear any jewelry or valuables and not metals on skin or hair AM of surgery   -- No makeup or moisturizer to face   -- No perfume/cologne, powder, lotions, aftershave or deodorant    Pt verbalized understanding.         *If going to , see below:     Directions and Instructions for [facility]   At [facility], we have an outstanding team of physicians, anesthesiologists, CRNAs, Registered Nurses, Surgical Technologists, and other ancillary team members all focused on your surgical and procedural care.   Before Your Procedure:   The physician's office will call you with a specific arrival time and directions a day or two before your scheduled procedure. You may also receive these instructions through your [facility] portal.   Day of Procedure:   Please be sure to arrive at the arrival time given or you may risk your surgery being delayed or  canceled. The arrival time is earlier than your scheduled surgery or procedure time. In the winter months please dress warm and bring blankets for you or your child as the waiting room may be cold. If you have difficulty locating the facility, please give us a call at [phone].   Directions:   The [facility] is located on the 1st floor of the hospital building near the [place] entrance.   Parking:   You will park in the South Parking Garage (note location on map). [facility] opens at 5:00 a.m. and has a drop off area by the entrance.  parking is available starting at 7:00 a.m. Please see below for further  parking instructions.   Directions from the parking garage elevators   Blue [facility] Elevators: From the parking garage, take the blue [place] elevators (located in the center of the parking garage) to the 1st floor of the garage. You will then take a right once off the elevators then another right to the outside of the parking garage. You will be across from the [facility]. You will walk down the sidewalk, pass the  curve at the [place] entrance and continue to follow the sidewalk. You will pass the radiation oncology entrance on your right. Continue to follow the sidewalk to the [facility] glass door entrance.   Hospital Entrance (Inside Route): If a mostly inside route is preferred: Take the inside elevator bank (located at the far north end of the garage) from the parking garage to the 1st floor. On the 1st floor walk past [employer]. Keep walking down the center of the hallway towards the hospital elevators. Once you reach the red brick regina, take a left and go past the hospital elevators. Take another left and follow the blue and white [place] signs around the hallway to the end. Go outside of the door. You will see the [facility] entrance to your right.   Drop Off:   There is a drop off area at  the doors of the [facility] for your convenience. If utilized for pediatric patients, an adult must accompany the patient into the surgery center while another adult escobar the vehicle.   Julio (at 7:00 a.m.):   Upon check-in, please let the  know that you are utilizing [employer] parking which is free. The . will then call [employer] for your car to be picked up. Your keys and phone number will be collected and given to [employer] services. You will then be given a ticket. Upon discharge, [employer] will be notified to bring your vehicle back when you are ready.   2/6/2024      If going to 2nd floor surgery center, see below:    Directions to the 2nd floor ([facility]) Surgery Center  The hallway to get to the surgery center is on the 2nd fl between the gold elevators in the atrium.  Follow the hallway into the waiting room (has a fish tank) and check in at desk.

## 2024-11-13 ENCOUNTER — ANESTHESIA EVENT (OUTPATIENT)
Dept: SURGERY | Facility: HOSPITAL | Age: 24
End: 2024-11-13
Payer: MEDICAID

## 2024-11-13 ENCOUNTER — ANESTHESIA (OUTPATIENT)
Dept: SURGERY | Facility: HOSPITAL | Age: 24
End: 2024-11-13
Payer: MEDICAID

## 2024-11-13 ENCOUNTER — HOSPITAL ENCOUNTER (OUTPATIENT)
Facility: HOSPITAL | Age: 24
Discharge: HOME | End: 2024-11-13
Attending: OTOLARYNGOLOGY | Admitting: OTOLARYNGOLOGY
Payer: MEDICAID

## 2024-11-13 VITALS
BODY MASS INDEX: 53.92 KG/M2 | DIASTOLIC BLOOD PRESSURE: 63 MMHG | HEART RATE: 80 BPM | RESPIRATION RATE: 15 BRPM | WEIGHT: 293 LBS | HEIGHT: 62 IN | OXYGEN SATURATION: 98 % | TEMPERATURE: 98 F | SYSTOLIC BLOOD PRESSURE: 134 MMHG

## 2024-11-13 DIAGNOSIS — H71.90 CHOLESTEATOMA: ICD-10-CM

## 2024-11-13 LAB
B-HCG UR QL: NEGATIVE
BACTERIA SPEC ANAEROBE CULT: NORMAL
CTP QC/QA: YES
FUNGUS SPEC CULT: NORMAL

## 2024-11-13 PROCEDURE — 37000008 HC ANESTHESIA 1ST 15 MINUTES: Performed by: OTOLARYNGOLOGY

## 2024-11-13 PROCEDURE — 25000003 PHARM REV CODE 250: Performed by: STUDENT IN AN ORGANIZED HEALTH CARE EDUCATION/TRAINING PROGRAM

## 2024-11-13 PROCEDURE — 27201423 OPTIME MED/SURG SUP & DEVICES STERILE SUPPLY: Performed by: OTOLARYNGOLOGY

## 2024-11-13 PROCEDURE — 71000044 HC DOSC ROUTINE RECOVERY FIRST HOUR: Performed by: OTOLARYNGOLOGY

## 2024-11-13 PROCEDURE — 81025 URINE PREGNANCY TEST: CPT | Performed by: OTOLARYNGOLOGY

## 2024-11-13 PROCEDURE — 63600175 PHARM REV CODE 636 W HCPCS: Performed by: OTOLARYNGOLOGY

## 2024-11-13 PROCEDURE — 88304 TISSUE EXAM BY PATHOLOGIST: CPT | Mod: 26,,, | Performed by: STUDENT IN AN ORGANIZED HEALTH CARE EDUCATION/TRAINING PROGRAM

## 2024-11-13 PROCEDURE — 88304 TISSUE EXAM BY PATHOLOGIST: CPT | Performed by: STUDENT IN AN ORGANIZED HEALTH CARE EDUCATION/TRAINING PROGRAM

## 2024-11-13 PROCEDURE — 71000015 HC POSTOP RECOV 1ST HR: Performed by: OTOLARYNGOLOGY

## 2024-11-13 PROCEDURE — 36000708 HC OR TIME LEV III 1ST 15 MIN: Performed by: OTOLARYNGOLOGY

## 2024-11-13 PROCEDURE — 63600175 PHARM REV CODE 636 W HCPCS: Performed by: STUDENT IN AN ORGANIZED HEALTH CARE EDUCATION/TRAINING PROGRAM

## 2024-11-13 PROCEDURE — 69720 RELEASE FACIAL NERVE: CPT | Mod: 58,51,RT, | Performed by: OTOLARYNGOLOGY

## 2024-11-13 PROCEDURE — 36000709 HC OR TIME LEV III EA ADD 15 MIN: Performed by: OTOLARYNGOLOGY

## 2024-11-13 PROCEDURE — 63600175 PHARM REV CODE 636 W HCPCS: Performed by: ANESTHESIOLOGY

## 2024-11-13 PROCEDURE — 25000003 PHARM REV CODE 250: Performed by: OTOLARYNGOLOGY

## 2024-11-13 PROCEDURE — 69645 REVISE MIDDLE EAR & MASTOID: CPT | Mod: 58,RT,, | Performed by: OTOLARYNGOLOGY

## 2024-11-13 PROCEDURE — 37000009 HC ANESTHESIA EA ADD 15 MINS: Performed by: OTOLARYNGOLOGY

## 2024-11-13 RX ORDER — ONDANSETRON 4 MG/1
4 TABLET, ORALLY DISINTEGRATING ORAL EVERY 8 HOURS PRN
Qty: 8 TABLET | Refills: 0 | Status: SHIPPED | OUTPATIENT
Start: 2024-11-13

## 2024-11-13 RX ORDER — FENTANYL CITRATE 50 UG/ML
25 INJECTION, SOLUTION INTRAMUSCULAR; INTRAVENOUS EVERY 5 MIN PRN
Status: DISCONTINUED | OUTPATIENT
Start: 2024-11-13 | End: 2024-11-13 | Stop reason: HOSPADM

## 2024-11-13 RX ORDER — SODIUM CHLORIDE 0.9 % (FLUSH) 0.9 %
10 SYRINGE (ML) INJECTION
Status: DISCONTINUED | OUTPATIENT
Start: 2024-11-13 | End: 2024-11-13 | Stop reason: HOSPADM

## 2024-11-13 RX ORDER — GLUCAGON 1 MG
1 KIT INJECTION
Status: DISCONTINUED | OUTPATIENT
Start: 2024-11-13 | End: 2024-11-13 | Stop reason: HOSPADM

## 2024-11-13 RX ORDER — NEOMYCIN SULFATE, POLYMYXIN B SULFATE AND HYDROCORTISONE 10; 3.5; 1 MG/ML; MG/ML; [USP'U]/ML
3 SUSPENSION/ DROPS AURICULAR (OTIC) 3 TIMES DAILY
Qty: 10 ML | Refills: 2 | Status: SHIPPED | OUTPATIENT
Start: 2024-11-20 | End: 2024-11-13

## 2024-11-13 RX ORDER — SUCCINYLCHOLINE CHLORIDE 20 MG/ML
INJECTION INTRAMUSCULAR; INTRAVENOUS
Status: DISCONTINUED | OUTPATIENT
Start: 2024-11-13 | End: 2024-11-13

## 2024-11-13 RX ORDER — CEFAZOLIN SODIUM 1 G/3ML
INJECTION, POWDER, FOR SOLUTION INTRAMUSCULAR; INTRAVENOUS
Status: DISCONTINUED | OUTPATIENT
Start: 2024-11-13 | End: 2024-11-13

## 2024-11-13 RX ORDER — SODIUM CHLORIDE 9 MG/ML
INJECTION, SOLUTION INTRAVENOUS CONTINUOUS
Status: DISCONTINUED | OUTPATIENT
Start: 2024-11-13 | End: 2024-11-13 | Stop reason: HOSPADM

## 2024-11-13 RX ORDER — HYDROCODONE BITARTRATE AND ACETAMINOPHEN 10; 325 MG/1; MG/1
1 TABLET ORAL ONCE AS NEEDED
Status: COMPLETED | OUTPATIENT
Start: 2024-11-13 | End: 2024-11-13

## 2024-11-13 RX ORDER — PROPOFOL 10 MG/ML
VIAL (ML) INTRAVENOUS
Status: DISCONTINUED | OUTPATIENT
Start: 2024-11-13 | End: 2024-11-13

## 2024-11-13 RX ORDER — DROPERIDOL 2.5 MG/ML
0.62 INJECTION, SOLUTION INTRAMUSCULAR; INTRAVENOUS ONCE AS NEEDED
Status: DISCONTINUED | OUTPATIENT
Start: 2024-11-13 | End: 2024-11-13 | Stop reason: HOSPADM

## 2024-11-13 RX ORDER — OFLOXACIN 3 MG/ML
SOLUTION/ DROPS OPHTHALMIC
Status: DISCONTINUED | OUTPATIENT
Start: 2024-11-13 | End: 2024-11-13 | Stop reason: HOSPADM

## 2024-11-13 RX ORDER — ONDANSETRON 4 MG/1
4 TABLET, ORALLY DISINTEGRATING ORAL EVERY 8 HOURS PRN
Qty: 8 TABLET | Refills: 0 | Status: SHIPPED | OUTPATIENT
Start: 2024-11-13 | End: 2024-11-13

## 2024-11-13 RX ORDER — NEOMYCIN SULFATE, POLYMYXIN B SULFATE AND HYDROCORTISONE 10; 3.5; 1 MG/ML; MG/ML; [USP'U]/ML
3 SUSPENSION/ DROPS AURICULAR (OTIC) 3 TIMES DAILY
Qty: 10 ML | Refills: 2 | Status: SHIPPED | OUTPATIENT
Start: 2024-11-20 | End: 2024-12-11

## 2024-11-13 RX ORDER — HYDROCODONE BITARTRATE AND ACETAMINOPHEN 10; 325 MG/1; MG/1
1 TABLET ORAL EVERY 6 HOURS PRN
Qty: 15 TABLET | Refills: 0 | Status: SHIPPED | OUTPATIENT
Start: 2024-11-13

## 2024-11-13 RX ORDER — KETAMINE HCL IN 0.9 % NACL 50 MG/5 ML
SYRINGE (ML) INTRAVENOUS
Status: DISCONTINUED | OUTPATIENT
Start: 2024-11-13 | End: 2024-11-13

## 2024-11-13 RX ORDER — DEXMEDETOMIDINE HYDROCHLORIDE 100 UG/ML
INJECTION, SOLUTION INTRAVENOUS
Status: DISCONTINUED | OUTPATIENT
Start: 2024-11-13 | End: 2024-11-13

## 2024-11-13 RX ORDER — LIDOCAINE HYDROCHLORIDE AND EPINEPHRINE 10; 10 UG/ML; MG/ML
INJECTION, SOLUTION INFILTRATION; PERINEURAL
Status: DISCONTINUED | OUTPATIENT
Start: 2024-11-13 | End: 2024-11-13 | Stop reason: HOSPADM

## 2024-11-13 RX ORDER — LIDOCAINE HYDROCHLORIDE 10 MG/ML
1 INJECTION, SOLUTION EPIDURAL; INFILTRATION; INTRACAUDAL; PERINEURAL ONCE AS NEEDED
Status: DISCONTINUED | OUTPATIENT
Start: 2024-11-13 | End: 2024-11-13 | Stop reason: HOSPADM

## 2024-11-13 RX ORDER — FENTANYL CITRATE 50 UG/ML
INJECTION, SOLUTION INTRAMUSCULAR; INTRAVENOUS
Status: DISCONTINUED | OUTPATIENT
Start: 2024-11-13 | End: 2024-11-13

## 2024-11-13 RX ORDER — HYDROCODONE BITARTRATE AND ACETAMINOPHEN 10; 325 MG/1; MG/1
1 TABLET ORAL EVERY 6 HOURS PRN
Qty: 15 TABLET | Refills: 0 | Status: SHIPPED | OUTPATIENT
Start: 2024-11-13 | End: 2024-11-13

## 2024-11-13 RX ORDER — ONDANSETRON HYDROCHLORIDE 2 MG/ML
INJECTION, SOLUTION INTRAVENOUS
Status: DISCONTINUED | OUTPATIENT
Start: 2024-11-13 | End: 2024-11-13

## 2024-11-13 RX ORDER — MIDAZOLAM HYDROCHLORIDE 1 MG/ML
INJECTION INTRAMUSCULAR; INTRAVENOUS
Status: DISCONTINUED | OUTPATIENT
Start: 2024-11-13 | End: 2024-11-13

## 2024-11-13 RX ORDER — LIDOCAINE HYDROCHLORIDE 20 MG/ML
INJECTION INTRAVENOUS
Status: DISCONTINUED | OUTPATIENT
Start: 2024-11-13 | End: 2024-11-13

## 2024-11-13 RX ADMIN — PROPOFOL 50 MG: 10 INJECTION, EMULSION INTRAVENOUS at 03:11

## 2024-11-13 RX ADMIN — SODIUM CHLORIDE: 0.9 INJECTION, SOLUTION INTRAVENOUS at 03:11

## 2024-11-13 RX ADMIN — LIDOCAINE HYDROCHLORIDE 100 MG: 20 INJECTION INTRAVENOUS at 03:11

## 2024-11-13 RX ADMIN — FENTANYL CITRATE 50 MCG: 50 INJECTION, SOLUTION INTRAMUSCULAR; INTRAVENOUS at 06:11

## 2024-11-13 RX ADMIN — DEXMEDETOMIDINE 12 MCG: 100 INJECTION, SOLUTION, CONCENTRATE INTRAVENOUS at 04:11

## 2024-11-13 RX ADMIN — FENTANYL CITRATE 100 MCG: 50 INJECTION, SOLUTION INTRAMUSCULAR; INTRAVENOUS at 03:11

## 2024-11-13 RX ADMIN — HYDROCODONE BITARTRATE AND ACETAMINOPHEN 1 TABLET: 10; 325 TABLET ORAL at 08:11

## 2024-11-13 RX ADMIN — Medication 15 MG: at 03:11

## 2024-11-13 RX ADMIN — FENTANYL CITRATE 25 MCG: 50 INJECTION INTRAMUSCULAR; INTRAVENOUS at 08:11

## 2024-11-13 RX ADMIN — Medication 10 MG: at 06:11

## 2024-11-13 RX ADMIN — CEFAZOLIN 3 G: 330 INJECTION, POWDER, FOR SOLUTION INTRAMUSCULAR; INTRAVENOUS at 04:11

## 2024-11-13 RX ADMIN — PROPOFOL 200 MG: 10 INJECTION, EMULSION INTRAVENOUS at 03:11

## 2024-11-13 RX ADMIN — GLYCOPYRROLATE 0.2 MG: 0.2 INJECTION, SOLUTION INTRAMUSCULAR; INTRAVENOUS at 03:11

## 2024-11-13 RX ADMIN — DEXMEDETOMIDINE 8 MCG: 100 INJECTION, SOLUTION, CONCENTRATE INTRAVENOUS at 03:11

## 2024-11-13 RX ADMIN — MIDAZOLAM HYDROCHLORIDE 2 MG: 2 INJECTION, SOLUTION INTRAMUSCULAR; INTRAVENOUS at 03:11

## 2024-11-13 RX ADMIN — SUCCINYLCHOLINE 200 MG: 20 INJECTION, SOLUTION INTRAMUSCULAR; INTRAVENOUS at 03:11

## 2024-11-13 RX ADMIN — ONDANSETRON 4 MG: 2 INJECTION INTRAMUSCULAR; INTRAVENOUS at 03:11

## 2024-11-13 NOTE — ANESTHESIA PREPROCEDURE EVALUATION
11/13/2024  Liat Hernandez is a 24 y.o., female with chronic TM rupture, hx of L Meier's palsy, MDD, panic disorder and morbid obesity being admitted to hospital medicine as a transfer from [facility] for ENT evaluation of right mastoiditis with facial paralysis.     Pre-operative evaluation for Procedure(s) (LRB):  TYMPANOPLASTY, WITH MASTOIDECTOMY (Right)    Liat Hernandez is a 24 y.o. female     Patient Active Problem List   Diagnosis    Acute mastoiditis of right side with facial paralysis    Sepsis    Tympanic membrane rupture, bilateral    History of Bell palsy    Class 3 severe obesity with body mass index (BMI) of 50.0 to 59.9 in adult    Major depressive disorder, recurrent episode, moderate    Agoraphobia with panic disorder    Hypokalemia       Review of patient's allergies indicates:   Allergen Reactions    Penicillins Itching     Pt. Reports throat itches when she takes it    Sulfa (sulfonamide antibiotics) Dermatitis       No current facility-administered medications on file prior to encounter.     Current Outpatient Medications on File Prior to Encounter   Medication Sig Dispense Refill    buPROPion (WELLBUTRIN XL) 150 MG TB24 tablet Take 150 mg by mouth every morning.      busPIRone (BUSPAR) 7.5 MG tablet Take 7.5 mg by mouth once daily.      cefdinir (OMNICEF) 300 MG capsule Take 1 capsule (300 mg total) by mouth 2 (two) times daily. for 8 days 16 capsule 0    ciprofloxacin-dexAMETHasone 0.3-0.1% (CIPRODEX) 0.3-0.1 % DrpS Place 4 drops into the right ear 2 (two) times daily. for 7 days 7.5 mL 0    EScitalopram oxalate (LEXAPRO) 10 MG tablet Take 10 mg by mouth once daily.      predniSONE (DELTASONE) 20 MG tablet Take 3 tablets (60 mg total) by mouth once daily for 3 days, THEN 2 tablets (40 mg total) once daily for 4 days, THEN 1 tablet (20 mg total) once daily for 4 days. 21 tablet  0    acetaminophen (TYLENOL) 500 MG tablet Take 2 tablets (1,000 mg total) by mouth every 6 (six) hours as needed for Pain.      clonazePAM (KLONOPIN) 0.5 MG tablet Take 0.5 mg by mouth 2 (two) times daily as needed for Anxiety.      traZODone (DESYREL) 50 MG tablet nightly as needed.         Past Surgical History:   Procedure Laterality Date    MYRINGOTOMY WITH INSERTION OF VENTILATION TUBE Right 11/9/2024    Procedure: MYRINGOTOMY, WITH TYMPANOSTOMY TUBE INSERTION;  Surgeon: Se Jones MD;  Location: [facility] OR [address] [place] [address];  Service: ENT;  Laterality: Right;    MYRINGOTOMY WITH INSERTION OF VENTILATION TUBE Right 11/9/2024    Procedure: MYRINGOTOMY, WITH TYMPANOSTOMY TUBE INSERTION;  Surgeon: Se Jones MD;  Location: [facility] OR [address] [place] [address];  Service: ENT;  Laterality: Right;       Social History     Socioeconomic History    Marital status: Single   Tobacco Use    Smoking status: Never     Passive exposure: Never   Substance and Sexual Activity    Alcohol use: Never    Drug use: Never     Social Drivers of Health     Financial Resource Strain: High Risk (11/8/2024)    Overall Financial Resource Strain (CARDIA)     Difficulty of Paying Living Expenses: Hard   Food Insecurity: No Food Insecurity (11/8/2024)    Hunger Vital Sign     Worried About Running Out of Food in the Last Year: Never true     Ran Out of Food in the Last Year: Never true   Transportation Needs: No Transportation Needs (11/8/2024)    TRANSPORTATION NEEDS     Transportation : No   Physical Activity: Insufficiently Active (11/8/2024)    Exercise Vital Sign     Days of Exercise per Week: 2 days     Minutes of Exercise per Session: 20 min   Stress: Stress Concern Present (11/8/2024)    [employer] - Occupational Stress Questionnaire     Feeling of Stress : Rather much   Housing Stability: Low Risk  (11/8/2024)    Housing Stability Vital Sign     Unable to Pay for Housing in the Last Year: No     Homeless in the Last Year:  No         CBC:   Recent Labs     11/11/24  0610   WBC 16.40*   RBC 4.53   HGB 11.5*   HCT 36.7*      MCV 81*   MCH 25.4*   MCHC 31.3*       CMP:   Recent Labs     11/11/24  0610      K 3.0*      CO2 26   BUN 8   CREATININE 0.6   GLU 84   MG 1.8   PHOS 2.9   CALCIUM 8.1*         Pre-op Assessment    I have reviewed the Patient Summary Reports.    I have reviewed the NPO Status.   I have reviewed the Medications.     Review of Systems  Anesthesia Hx:  No problems with previous Anesthesia   History of prior surgery of interest to airway management or planning:            Denies Personal Hx of Anesthesia complications.                    Cardiovascular:     Hypertension                                          Pulmonary:  Pulmonary Normal                       Hepatic/GI:  Hepatic/GI Normal                    Neurological:    Neuromuscular Disease,                                   Endocrine:        Morbid Obesity / BMI > 40  Psych:  Psychiatric History                  Physical Exam  General: Well nourished, Cooperative, Alert and Oriented    Airway:  Mallampati: IV   TM Distance: > 6 cm  Neck ROM: Normal ROM    Dental:  Intact        Anesthesia Plan  Type of Anesthesia, risks & benefits discussed:    Anesthesia Type: Gen ETT  Intra-op Monitoring Plan: Standard ASA Monitors  Post Op Pain Control Plan: multimodal analgesia  Induction:  IV  Airway Plan: Direct, Post-Induction  Informed Consent: Informed consent signed with the Patient and all parties understand the risks and agree with anesthesia plan.  All questions answered.   ASA Score: 3    Ready For Surgery From Anesthesia Perspective.     .

## 2024-11-13 NOTE — H&P
[facility]ck Surgery            Chief Complaint/Reason for Admission: R mastoiditis      History of Present Illness: Liat Hernandez is a 23 y.o. female with past medical and surgical history as detailed below that presents to the hospital for Mastoiditis [H70.90]  on 11/8/2024. She notes she has been having pain and decreased hearing from the right ear with associated tendnerness behind the ear for the last 3 days. Started worsening and noted to have facial n palsy at outside hospital.    Was discharged from the hospital last week after R PET. Rpoerts continued minimal drainage, use gtt and po abx, and also has peristent facial paralysis. No new symptoms.         Denies recent voice changes, difficulty breathing/SOB, dysphagia, odynophagia, new lumps/bumps of head/neck, unintentional wt loss.  Denies recent nasal obstruction, rhinorrhea, PND, facial pain/pressure, anosmia.   Denies recent tinnitus, otorrhea, otalgia, dizziness, balance difficulties.            Medications:  Continuous Infusions:  Scheduled Meds:   cefTAZidime IV (PEDS and ADULTS)  1 g Intravenous Q8H    metroNIDAZOLE IV (PEDS and ADULTS)  500 mg Intravenous Q8H    [START ON 11/9/2024] mupirocin   Nasal BID    [START ON 11/9/2024] vancomycin (VANCOCIN) IV (PEDS and ADULTS)  2,000 mg Intravenous Q12H      PRN Meds:  Current Facility-Administered Medications:     acetaminophen, 650 mg, Oral, Q8H PRN    acetaminophen, 650 mg, Oral, Q4H PRN    albuterol-ipratropium, 3 mL, Nebulization, Q6H PRN    aluminum-magnesium hydroxide-simethicone, 30 mL, Oral, QID PRN    dextrose 10%, 12.5 g, Intravenous, PRN    dextrose 10%, 25 g, Intravenous, PRN    glucagon (human recombinant), 1 mg, Intramuscular, PRN    glucose, 16 g, Oral, PRN    glucose, 24 g, Oral, PRN    melatonin, 6 mg, Oral, Nightly PRN    naloxone, 0.02 mg, Intravenous, PRN    ondansetron, 8 mg, Oral, Q8H PRN    sodium chloride 0.9%, 10 mL,  Intravenous, Q12H PRN    Pharmacy to dose Vancomycin consult, , , Once **AND** vancomycin - pharmacy to dose, , Intravenous, pharmacy to manage frequency          Current Facility-Administered Medications on File Prior to Encounter   Medication    [COMPLETED] cefTRIAXone (ROCEPHIN) 2 g in dextrose 5 % 100 mL IVPB (ready to mix)    [COMPLETED] gadobutroL (GADAVIST) injection 15 mL    [COMPLETED] iopamidoL (ISOVUE-370) injection 100 mL    [COMPLETED] vancomycin (VANCOCIN) 2,500 mg in D5W 500 mL IVPB    [DISCONTINUED] cefTRIAXone (ROCEPHIN) 1 g in dextrose 5 % 100 mL IVPB (ready to mix)    [DISCONTINUED] vancomycin (VANCOCIN) 2,000 mg in 0.9% NaCl 500 mL IVPB    [DISCONTINUED] vancomycin - pharmacy to dose           Current Outpatient Medications on File Prior to Encounter   Medication Sig    buPROPion (WELLBUTRIN XL) 150 MG TB24 tablet Take 150 mg by mouth every morning.    clonazePAM (KLONOPIN) 0.5 MG tablet Take 0.5 mg by mouth 2 (two) times daily as needed for Anxiety.    [DISCONTINUED] acyclovir (ZOVIRAX) 400 MG tablet Take 1 tablet (400 mg total) by mouth 5 (five) times daily. for 10 days    [DISCONTINUED] diclofenac (VOLTAREN) 75 MG EC tablet Take 1 tablet (75 mg total) by mouth 2 (two) times daily as needed (pain).    [DISCONTINUED] fluticasone propionate (FLONASE) 50 mcg/actuation nasal spray 1 spray (50 mcg total) by Each Nostril route 2 (two) times daily as needed for Rhinitis.    [DISCONTINUED] ibuprofen (ADVIL,MOTRIN) 600 MG tablet Take 1 tablet (600 mg total) by mouth every 6 (six) hours as needed for Pain.    [DISCONTINUED] ibuprofen (ADVIL,MOTRIN) 600 MG tablet Take 1 tablet (600 mg total) by mouth every 6 (six) hours as needed for Pain (fever).               Review of patient's allergies indicates:   Allergen Reactions    Penicillins Itching       Pt. Reports throat itches when she takes it    Sulfa (sulfonamide antibiotics)                Past Medical History:   Diagnosis Date    HTN (hypertension)         No past surgical history on file.  Family History    None               Tobacco Use    Smoking status: Never       Passive exposure: Never    Smokeless tobacco: Not on file   Substance and Sexual Activity    Alcohol use: Never    Drug use: Never    Sexual activity: Not on file      Review of Systems  Objective:        Weight: (!) 137 kg (302 lb 0.5 oz)  Body mass index is 55.24 kg/m².           Physical Exam   NAD  Awake and alert  Head atraumatic   Auricles WNL AU  Nose w/ normal external appearance  Face: house brackmann VI on the right. I on the left.   MMM, anterior tongue mobile, FOM soft, OP patent w/ midline uvula   Neck soft, not TTP, normal ROM, no LAD  Normal WOB, no stridor or stertor  External ears normal            Assessment/Plan:      * Acute mastoiditis of right side with facial paralysis  Liat Hernandez is a 23 y.o. female with Mastoiditis [H70.90] with associated facial nerve paralysis (house Brackmann VI on the right, I on the left).     To OR today w Dr. Wheeler for R mastoidectomy w tympanoplasty, possible OCR, possible facial nerve decompression

## 2024-11-13 NOTE — ANESTHESIA PROCEDURE NOTES
Intubation    Date/Time: 11/13/2024 3:35 PM    Performed by: Hans Sutton CRNA  Authorized by: Mariel Recinos MD    Intubation:     Induction:  Intravenous    Intubated:  Postinduction    Mask Ventilation:  Easy mask    Attempts:  1    Attempted By:  CRNA    Method of Intubation:  Video laryngoscopy    Blade:  Lozano 3    Laryngeal View Grade: Grade I - full view of cords      Difficult Airway Encountered?: No      Complications:  None    Airway Device:  Oral endotracheal tube    Airway Device Size:  7.5    Style/Cuff Inflation:  Cuffed    Inflation Amount (mL):  10    Tube secured:  22    Secured at:  The lips    Placement Verified By:  Capnometry and Revisualization with laryngoscopy    Complicating Factors:  None    Findings Post-Intubation:  BS equal bilateral and atraumatic/condition of teeth unchanged

## 2024-11-14 ENCOUNTER — NURSE TRIAGE (OUTPATIENT)
Dept: ADMINISTRATIVE | Facility: CLINIC | Age: 24
End: 2024-11-14
Payer: MEDICAID

## 2024-11-14 LAB
BACTERIA BLD CULT: NORMAL
BACTERIA BLD CULT: NORMAL

## 2024-11-14 NOTE — OP NOTE
yRan Burrows - Surgery ([facility])  Surgery Department  Operative Note    SUMMARY     Date of Procedure: 11/13/2024     Procedure:   Tympanoplasty with mastoidectomy, modified radical  Facial nerve decompress tympanic segment    Surgeons and Role:     * Joshua Wheeler MD - Primary     * Carolin Osullivan MD - Resident - Assisting        Pre-Operative Diagnosis: Facial nerve paralysis [G51.0]    Post-Operative Diagnosis: Post-Op Diagnosis Codes:     * Facial nerve paralysis [G51.0]    Anesthesia: General    Operative Findings (including complications, if any):   Attic cholesteatoma, sever inflammation underneath incus covering facial nerve.  Facial nerve dehiscent along tympanic segment. Severe sclerosis of mastoid with low lying tegmen and anterior sigmoid sinus requiring MRM  MRM performed with incus removal, tympanic segment decompress and freed of inflammatory. Tissue.  Able to stimulate nerve at lowest threshold of 0.5mv at tympanic segment and 1st genu     Indications for surgery:     This is a 24-year-old female with acute onset HB 6 facial palsy of the right side 6 days ago.  Patient reported prior history of ear infections in the right ear.  After having a myringotomy tube placed it was identified possible cholesteatoma by another provider.  This was later confirmed.  CT and MRI showed opacification of middle ear and mastoid.  Based on presence of cholesteatoma recommended urgent tympanomastoidectomy for removal of cholesteatoma and decompression of nerve.    Description of Technical Procedures:     Patient was brought to the operating room general anesthesia was induced.  The patient was intubated the bed was turned 180°.  The right ear was sterilely prepped and draped in standard fashion for otologic surgery.  The facial nerve monitor was set up and used for the duration of the procedure.    The operating microscope was brought into the field and used for the remainder of the operation.  The ear canal was  examined.  The pars tensa was fully intact however there was a defect of the attic with keratin debris consistent with cholesteatoma appearing to adhere to the incus.  This was tracking into the epitympanum towards the antrum.  Ear canal was anesthetized 1% lidocaine with epinephrine.  Vascular strip incision was made approximately 6 mm from the annulus.  And then we proceeded behind ear.      A C-shaped postauricular incision was made and soft tissue flaps were raised anteriorly and posteriorly above the temporalis fascia and mastoid periosteum.  Self-retaining retractors were placed.  A temporalis fascia graft was harvested using scissors.  This was set aside to dry on a silastic block.  A T-shaped periosteal incision was made along the linea temporalis and across the face of the mastoid cortex.  The periosteum was elevated and the retractors were replaced after exposing the entire mastoid.  The ear canal skin was elevated until our vascular strip incision was identified.  This was flipped out from the ear canal and retracted with a self-retaining retractor.  Then a tympanomeatal flap incision was made above the attic defect and at 6 o'clock position inferiorly this flap was elevated in the middle ear was entered sharply.  There appeared to be no cholesteatoma involving the meso tympanum.  However there was severe inflammatory tissue underneath the incus locking the tympanic isthmus in the area of the facial nerve.  Cholesteatoma sac was  from our tympanomeatal flap and the flap was rotated anteriorly pedicled on the malleus.  We then proceeded with mastoidectomy to get to the posterior aspect of the cholesteatoma.      Using a large cutting bur the tegmen was skeletonized.  Once this was identified the sigmoid was also skeletonized.  Her mastoid was extremely sclerotic.  The sigmoid was within a mm of the mastoid cortex and very close to approximate the ear canal.  Also the tegmen appeared to be low  lying there was no available space between the ear canal superiorly in the tegmen itself which almost met with the ear canal.  After identifying these 2 structures the Sino dural angle was developed and a small antrum was entered.  Despite this due to the low-lying tegmen there was no way to get access to the epitympanum without removing the canal wall.  Therefore canal wall down mastoidectomy was performed.  The canal wall was removed down to the level the facial nerve.  The anterior buttress was completely removed to allow access to the epitympanum.  Once this was revealed the cholesteatoma could be fully seen.  It was mostly quite small but can completely encasing the incus body.  Underneath this it it trapped inflammatory debris that appeared infected this was completely covering the area of the facial nerve.  To decompress this the incus was removed.  Then using sharp microdissection the tympanic segment of the facial nerve was freed of inflammatory debris.  It was completely dehiscent.  We then decompress it up to the 1st genu.  Also the facial recess was opened and inflammatory tissue was removed from here.  The mastoid segment and 2nd genu appeared uninvolved.  We then stimulated the nerve using the nerve probe.  We were able to get stimulation at the lowest level of 0.5 mV.  This point we finished the resective portion of our procedure.      A 1 cut meatoplasty was performed and a piece of chondral cartilage and soft tissue were removed from meatus.  This allowed the meatus to fold into the cavity.  Cartilage was placed over the stapes capitulum after cutting it to size then a temporalis fascia was used to create a new tympanic membrane which was draped over the facial ridge.  Gelfoam was placed on top of the reconstruction and filling the entire cavity this was all soaked in antibiotic solution.  The periosteum was reapproximated.  And the meatus was then packed with Gelfoam.  A Cole dressing was  placed over the ear and the patient was turned over to Anesthesia awakened from the surgery.        Estimated Blood Loss (EBL): 50ml           Implants: * No implants in log *    Specimens:   Specimen (24h ago, onward)       Start     Ordered    11/13/24 1827  Specimen to Pathology, Surgery ENT  Once        Comments: Pre-op Diagnosis: Facial nerve paralysis [G51.0]Procedure(s):TYMPANOPLASTY, WITH MASTOIDECTOMY Number of specimens: 1Name of specimens: 1-Right ear cholesteatoma PERM     References:    Click here for ordering Quick Tip   Question Answer Comment   Procedure Type: ENT    Specimen Class: Routine/Screening    Release to patient Immediate        11/13/24 1906                            Condition: Good    Disposition: PACU - hemodynamically stable.    Attestation: Op Note Attestation: I was physically present and scrubbed for the entire procedure.

## 2024-11-14 NOTE — TRANSFER OF CARE
"Anesthesia Transfer of Care Note    Patient: Liat Hernandez    Procedure(s) Performed: Procedure(s) (LRB):  TYMPANOPLASTY, WITH MASTOIDECTOMY (Right)    Patient location: [facility]    Anesthesia Type: general    Transport from OR: Transported from OR on 6-10 L/min O2 by face mask with adequate spontaneous ventilation    Post pain: adequate analgesia    Post assessment: no apparent anesthetic complications and tolerated procedure well    Post vital signs: stable    Level of consciousness: awake    Nausea/Vomiting: no nausea/vomiting    Complications: none    Transfer of care protocol was followed    Last vitals: Visit Vitals  BP (!) 164/87   Pulse 82   Temp 36.7 °C (98.1 °F) (Temporal)   Resp 20   Ht 5' 2" (1.575 m)   Wt (!) 137 kg (302 lb 0.5 oz)   LMP 11/11/2024 (Approximate)   SpO2 98%   Breastfeeding No   BMI 55.24 kg/m²     "

## 2024-11-14 NOTE — TELEPHONE ENCOUNTER
Pts mother called stating pt had surgery yesterday on her ear and she removed the dressing today and was told there would be a cotton ball in pts ear but she does not see one. Pt requesting call back from doctors office and possibly an earlier f/u appt.   Reason for Disposition   [1] Caller requests to speak ONLY to PCP AND [2] NON-URGENT question    Protocols used: PCP Call - No Triage-A-

## 2024-11-14 NOTE — BRIEF OP NOTE
Ryan Burrows - Surgery ([facility])  Brief Operative Note    Surgery Date: 11/13/2024     Surgeons and Role:     * Joshua Wheeler MD - Primary     * Carolin Osullivan MD - Resident - Assisting        Pre-op Diagnosis:  Facial nerve paralysis [G51.0]    Post-op Diagnosis:  Post-Op Diagnosis Codes:     * Facial nerve paralysis [G51.0]    Procedure(s) (LRB):  TYMPANOPLASTY, WITH MASTOIDECTOMY (Right)    Anesthesia: General    Operative Findings:   Attic retraction with cholesteatoma in epitympanum   Sclerotic mastoid   Cholesteatoma adherent to incus, unable to be , incus removed   Edematous facial nerve, response with stimulus at 0.5 mV   Cartilage graft placed over stapes capitulum with temporalis fascia underlay graft   Canal wall down     Estimated Blood Loss: * No values recorded between 11/13/2024  4:04 PM and 11/13/2024  7:20 PM *         Specimens:   Specimen (24h ago, onward)       Start     Ordered    11/13/24 1827  Specimen to Pathology, Surgery ENT  Once        Comments: Pre-op Diagnosis: Facial nerve paralysis [G51.0]Procedure(s):TYMPANOPLASTY, WITH MASTOIDECTOMY Number of specimens: 1Name of specimens: 1-Right ear cholesteatoma PERM     References:    Click here for ordering Quick Tip   Question Answer Comment   Procedure Type: ENT    Specimen Class: Routine/Screening    Release to patient Immediate        11/13/24 1906                      Discharge Note    OUTCOME: Patient tolerated treatment/procedure well without complication and is now ready for discharge.    DISPOSITION: Home or Self Care    FINAL DIAGNOSIS:  <principal problem not specified>    FOLLOWUP: In clinic    DISCHARGE INSTRUCTIONS:  No discharge procedures on file.     Clinical Reference Documents Added to Patient Instructions         Document    EARDRUM REPAIR (ENGLISH)    MASTOIDECTOMY DISCHARGE INSTRUCTIONS (ENGLISH)

## 2024-11-15 ENCOUNTER — TELEPHONE (OUTPATIENT)
Dept: OTOLARYNGOLOGY | Facility: CLINIC | Age: 24
End: 2024-11-15
Payer: MEDICAID

## 2024-11-15 LAB
FINAL PATHOLOGIC DIAGNOSIS: NORMAL
GROSS: NORMAL
Lab: NORMAL
MICROSCOPIC EXAM: NORMAL

## 2024-11-15 NOTE — TELEPHONE ENCOUNTER
Call left message for mom explaining she can put a cotton ball at the opening of the pts ear and change as needed, pt will have some bleeding which is normal after surgery

## 2024-11-15 NOTE — ANESTHESIA POSTPROCEDURE EVALUATION
Anesthesia Post Evaluation    Patient: Liat Hernandez    Procedure(s) Performed: Procedure(s) (LRB):  TYMPANOPLASTY, WITH MASTOIDECTOMY (Right)    Final Anesthesia Type: general      Patient location during evaluation: PACU  Patient participation: Yes- Able to Participate  Level of consciousness: awake and alert  Post-procedure vital signs: reviewed and stable  Pain management: adequate  Airway patency: patent    PONV status at discharge: No PONV  Anesthetic complications: no      Cardiovascular status: blood pressure returned to baseline  Respiratory status: unassisted  Hydration status: euvolemic  Follow-up not needed.              Vitals Value Taken Time   /63 11/13/24 2101   Temp 36.7 °C (98.1 °F) 11/13/24 2100   Pulse 81 11/13/24 2107   Resp 25 11/13/24 2103   SpO2 98 % 11/13/24 2107   Vitals shown include unfiled device data.      No case tracking events are documented in the log.      Pain/Allegra Score: Pain Rating Prior to Med Admin: 5 (11/13/2024  8:27 PM)  Allegra Score: 9 (11/13/2024  8:15 PM)

## 2024-11-19 ENCOUNTER — OFFICE VISIT (OUTPATIENT)
Dept: OTOLARYNGOLOGY | Facility: CLINIC | Age: 24
End: 2024-11-19
Payer: MEDICAID

## 2024-11-19 DIAGNOSIS — Z09 POSTOPERATIVE EXAMINATION: Primary | ICD-10-CM

## 2024-11-19 PROCEDURE — 99024 POSTOP FOLLOW-UP VISIT: CPT | Mod: ,,, | Performed by: OTOLARYNGOLOGY

## 2024-11-19 PROCEDURE — 99212 OFFICE O/P EST SF 10 MIN: CPT | Mod: PBBFAC | Performed by: OTOLARYNGOLOGY

## 2024-11-19 PROCEDURE — 99999 PR PBB SHADOW E&M-EST. PATIENT-LVL II: CPT | Mod: PBBFAC,,, | Performed by: OTOLARYNGOLOGY

## 2024-11-19 PROCEDURE — 1159F MED LIST DOCD IN RCRD: CPT | Mod: CPTII,,, | Performed by: OTOLARYNGOLOGY

## 2024-11-19 NOTE — PROGRESS NOTES
Post Op Otology Visit     S/p AD CWD mastoidectomy with removal of cholesteatoma including incus and tympanic segment facial nerve decompression on 11/13. Presents for 1 week post op visit. Reports initially had some drainage but this has improved. Has been keeping ear dry. Has a couple days of po steroids for hospitalization left. No evidence of facial movement on the R.     Does report prior episode of Bell's palsy on the L took ~6 months to have any sign of movement in the left face.     Exam:   Auricles normal   Steri strips removed from postauricular incision AD   No evidence of hematoma or seroma, no fluctuance or erythema or evidence of infection  Packing is dry and intact   Superficial gel foam removed from meatus AD   HB VI/VI on the R, I/VI on the L       Plan:   Start cortisporin drops   Complete steroid taper  RTC 3 weeks     Carolin Osullivan MD   Otolaryngology-Head and Neck Surgery   PGY4

## 2024-12-13 ENCOUNTER — OFFICE VISIT (OUTPATIENT)
Dept: OTOLARYNGOLOGY | Facility: CLINIC | Age: 24
End: 2024-12-13
Payer: MEDICAID

## 2024-12-13 DIAGNOSIS — Z09 POSTOPERATIVE EXAMINATION: Primary | ICD-10-CM

## 2024-12-13 DIAGNOSIS — G51.0 FACIAL NERVE PARALYSIS: ICD-10-CM

## 2024-12-13 PROCEDURE — 99212 OFFICE O/P EST SF 10 MIN: CPT | Mod: PBBFAC | Performed by: OTOLARYNGOLOGY

## 2024-12-13 PROCEDURE — 99999 PR PBB SHADOW E&M-EST. PATIENT-LVL II: CPT | Mod: PBBFAC,,, | Performed by: OTOLARYNGOLOGY

## 2024-12-13 NOTE — PROGRESS NOTES
Post Op Otology Visit     S/p AD CWD mastoidectomy with removal of cholesteatoma including incus and tympanic segment facial nerve decompression on 11/13, presents for one-month follow-up.  No change in facial paralysis.  Overall doing fine otherwise.      Exam:   Auricles normal     Packing removed tympanic membrane intact cavity healing well without any granulation tissue  Superficial gel foam removed from meatus AD   HB VI/VI on the R, I/VI on the L       Plan:       Follow up in 1 month   We will consider referral to Dr. Jerome if not improved next visit

## 2025-02-18 ENCOUNTER — OFFICE VISIT (OUTPATIENT)
Dept: OTOLARYNGOLOGY | Facility: CLINIC | Age: 25
End: 2025-02-18
Payer: MEDICAID

## 2025-02-18 DIAGNOSIS — H71.91 CHOLESTEATOMA OF EAR, RIGHT: ICD-10-CM

## 2025-02-18 DIAGNOSIS — G51.0 FACIAL NERVE PARALYSIS: Primary | ICD-10-CM

## 2025-02-18 PROCEDURE — 69220 CLEAN OUT MASTOID CAVITY: CPT | Mod: S$PBB,RT,, | Performed by: OTOLARYNGOLOGY

## 2025-02-18 PROCEDURE — 1159F MED LIST DOCD IN RCRD: CPT | Mod: CPTII,,, | Performed by: OTOLARYNGOLOGY

## 2025-02-18 PROCEDURE — 99213 OFFICE O/P EST LOW 20 MIN: CPT | Mod: 25,S$PBB,, | Performed by: OTOLARYNGOLOGY

## 2025-02-18 PROCEDURE — 69220 CLEAN OUT MASTOID CAVITY: CPT | Mod: PBBFAC | Performed by: OTOLARYNGOLOGY

## 2025-02-18 PROCEDURE — 99999 PR PBB SHADOW E&M-EST. PATIENT-LVL II: CPT | Mod: PBBFAC,,, | Performed by: OTOLARYNGOLOGY

## 2025-02-18 PROCEDURE — 99212 OFFICE O/P EST SF 10 MIN: CPT | Mod: PBBFAC | Performed by: OTOLARYNGOLOGY

## 2025-02-22 NOTE — PROGRESS NOTES
Subjective     Patient ID: Liat Hernandez is a 24 y.o. female.    Chief Complaint: Post-op Evaluation    HPI    Liat Hernandez is a 24 y.o. female with history of acute onset facial paralysis associated with cholesteatoma of the right ear.  She is status post urgent modified radical mastoidectomy with facial nerve decompression on 11/13/2024.  She reports doing well denies any pain or drainage.  [place] is [place] out of [place] this [place].  She reports her facial paralysis has improved significantly.    Review of Systems   Constitutional: Negative.    HENT: Negative.     Eyes: Negative.    Respiratory: Negative.     Cardiovascular: Negative.    Gastrointestinal: Negative.    Endocrine: Negative.    Genitourinary: Negative.    Musculoskeletal: Negative.    Integumentary:  Negative.   Allergic/Immunologic: Negative.    Neurological: Negative.    Hematological: Negative.    Psychiatric/Behavioral: Negative.            Objective     Physical Exam  Vitals and nursing note reviewed.   Constitutional:       Appearance: Normal appearance.   HENT:      Head: Normocephalic and atraumatic.      Right Ear: External ear normal. There is no impacted cerumen.      Left Ear: Tympanic membrane, ear canal and external ear normal. There is no impacted cerumen.   Neurological:      Mental Status: She is alert.      Cranial Nerves: Facial asymmetry (HB2 on right improved from 6) present.     Procedure: Mastoid cavity debridement, simple right  Patient brought to the minor procedure room and with the use of the operating microscope the right cavity was cleaned of squamous and cerumenous debris. No evidence of residual or recurrent cholesteatoma. Patient tolerated procedure well.         Assessment and Plan     1. Facial nerve paralysis    2. Cholesteatoma of ear, right        Cavity appears to have healed well.  Recommend patient follow up every several months for cavity debridement.  She will find new ENT care after she moves  [facility], [place].         No follow-ups on file.
